# Patient Record
Sex: FEMALE | Race: WHITE | Employment: PART TIME | URBAN - METROPOLITAN AREA
[De-identification: names, ages, dates, MRNs, and addresses within clinical notes are randomized per-mention and may not be internally consistent; named-entity substitution may affect disease eponyms.]

---

## 2017-05-22 ENCOUNTER — TELEPHONE (OUTPATIENT)
Dept: OBGYN CLINIC | Age: 22
End: 2017-05-22

## 2017-05-22 RX ORDER — VALACYCLOVIR HYDROCHLORIDE 1 G/1
1000 TABLET, FILM COATED ORAL 2 TIMES DAILY
Qty: 20 TABLET | Refills: 0 | Status: SHIPPED | OUTPATIENT
Start: 2017-05-22 | End: 2017-06-01

## 2017-05-25 ENCOUNTER — HOSPITAL ENCOUNTER (OUTPATIENT)
Age: 22
Setting detail: SPECIMEN
Discharge: HOME | End: 2017-05-25
Payer: MEDICARE

## 2017-05-25 ENCOUNTER — OFFICE VISIT (OUTPATIENT)
Dept: OBGYN CLINIC | Age: 22
End: 2017-05-25
Payer: MEDICARE

## 2017-05-25 VITALS
RESPIRATION RATE: 16 BRPM | SYSTOLIC BLOOD PRESSURE: 112 MMHG | HEART RATE: 86 BPM | DIASTOLIC BLOOD PRESSURE: 60 MMHG | HEIGHT: 61 IN | BODY MASS INDEX: 37 KG/M2 | WEIGHT: 196 LBS

## 2017-05-25 DIAGNOSIS — N76.0 ACUTE VAGINITIS: ICD-10-CM

## 2017-05-25 DIAGNOSIS — N76.0 ACUTE VAGINITIS: Primary | ICD-10-CM

## 2017-05-25 DIAGNOSIS — R30.0 DYSURIA: ICD-10-CM

## 2017-05-25 LAB
-: ABNORMAL
AMORPHOUS: ABNORMAL
BACTERIA: ABNORMAL
BILIRUBIN URINE: NEGATIVE
CASTS UA: ABNORMAL /LPF
COLOR: YELLOW
COMMENT UA: ABNORMAL
CRYSTALS, UA: ABNORMAL /HPF
DIRECT EXAM: ABNORMAL
EPITHELIAL CELLS UA: ABNORMAL /HPF
GLUCOSE URINE: NEGATIVE
KETONES, URINE: NEGATIVE
LEUKOCYTE ESTERASE, URINE: NEGATIVE
Lab: ABNORMAL
MUCUS: ABNORMAL
NITRITE, URINE: NEGATIVE
OTHER OBSERVATIONS UA: ABNORMAL
PH UA: 5 (ref 5–8)
PROTEIN UA: NEGATIVE
RBC UA: ABNORMAL /HPF
RENAL EPITHELIAL, UA: ABNORMAL /HPF
SPECIFIC GRAVITY UA: 1.02 (ref 1–1.03)
SPECIMEN DESCRIPTION: ABNORMAL
SPECIMEN DESCRIPTION: ABNORMAL
STATUS: ABNORMAL
TRICHOMONAS: ABNORMAL
TURBIDITY: ABNORMAL
URINE HGB: NEGATIVE
UROBILINOGEN, URINE: NORMAL
WBC UA: ABNORMAL /HPF
YEAST: ABNORMAL

## 2017-05-25 PROCEDURE — 87510 GARDNER VAG DNA DIR PROBE: CPT

## 2017-05-25 PROCEDURE — 99213 OFFICE O/P EST LOW 20 MIN: CPT | Performed by: NURSE PRACTITIONER

## 2017-05-25 PROCEDURE — 87480 CANDIDA DNA DIR PROBE: CPT

## 2017-05-25 PROCEDURE — 87491 CHLMYD TRACH DNA AMP PROBE: CPT

## 2017-05-25 PROCEDURE — 87660 TRICHOMONAS VAGIN DIR PROBE: CPT

## 2017-05-25 PROCEDURE — 81003 URINALYSIS AUTO W/O SCOPE: CPT | Performed by: NURSE PRACTITIONER

## 2017-05-25 PROCEDURE — 81001 URINALYSIS AUTO W/SCOPE: CPT

## 2017-05-25 PROCEDURE — 87591 N.GONORRHOEAE DNA AMP PROB: CPT

## 2017-05-26 ENCOUNTER — TELEPHONE (OUTPATIENT)
Dept: OBGYN CLINIC | Age: 22
End: 2017-05-26

## 2017-05-26 LAB
C TRACH DNA GENITAL QL NAA+PROBE: NEGATIVE
N. GONORRHOEAE DNA: NEGATIVE

## 2017-05-26 RX ORDER — METRONIDAZOLE 500 MG/1
500 TABLET ORAL 2 TIMES DAILY
Qty: 14 TABLET | Refills: 0 | Status: SHIPPED | OUTPATIENT
Start: 2017-05-26 | End: 2017-06-02

## 2017-05-26 RX ORDER — FLUCONAZOLE 150 MG/1
150 TABLET ORAL ONCE
Qty: 2 TABLET | Refills: 0 | Status: SHIPPED | OUTPATIENT
Start: 2017-05-26 | End: 2017-05-26

## 2017-06-12 ENCOUNTER — TELEPHONE (OUTPATIENT)
Dept: OBGYN CLINIC | Age: 22
End: 2017-06-12

## 2017-06-12 DIAGNOSIS — N91.2 AMENORRHEA: Primary | ICD-10-CM

## 2017-06-13 ENCOUNTER — HOSPITAL ENCOUNTER (OUTPATIENT)
Age: 22
Discharge: HOME | End: 2017-06-13
Payer: MEDICARE

## 2017-06-13 DIAGNOSIS — N91.2 AMENORRHEA: ICD-10-CM

## 2017-06-13 LAB — HCG QUANTITATIVE: <1 IU/L

## 2017-06-13 PROCEDURE — 36415 COLL VENOUS BLD VENIPUNCTURE: CPT

## 2017-06-13 PROCEDURE — 84702 CHORIONIC GONADOTROPIN TEST: CPT

## 2018-03-15 ENCOUNTER — OFFICE VISIT (OUTPATIENT)
Dept: OBGYN CLINIC | Age: 23
End: 2018-03-15
Payer: MEDICARE

## 2018-03-15 ENCOUNTER — HOSPITAL ENCOUNTER (OUTPATIENT)
Age: 23
Setting detail: SPECIMEN
Discharge: HOME | End: 2018-03-15
Payer: MEDICARE

## 2018-03-15 VITALS
WEIGHT: 175 LBS | SYSTOLIC BLOOD PRESSURE: 116 MMHG | BODY MASS INDEX: 34.36 KG/M2 | HEIGHT: 60 IN | RESPIRATION RATE: 18 BRPM | HEART RATE: 78 BPM | DIASTOLIC BLOOD PRESSURE: 70 MMHG

## 2018-03-15 DIAGNOSIS — Z01.419 WELL FEMALE EXAM WITH ROUTINE GYNECOLOGICAL EXAM: Primary | ICD-10-CM

## 2018-03-15 PROCEDURE — 99395 PREV VISIT EST AGE 18-39: CPT | Performed by: ADVANCED PRACTICE MIDWIFE

## 2018-03-15 PROCEDURE — G0145 SCR C/V CYTO,THINLAYER,RESCR: HCPCS

## 2018-03-15 RX ORDER — NORGESTIMATE AND ETHINYL ESTRADIOL 7DAYSX3 28
1 KIT ORAL DAILY
Qty: 28 TABLET | Refills: 12 | Status: SHIPPED | OUTPATIENT
Start: 2018-03-15 | End: 2019-07-15 | Stop reason: ALTCHOICE

## 2018-03-15 ASSESSMENT — PATIENT HEALTH QUESTIONNAIRE - PHQ9
1. LITTLE INTEREST OR PLEASURE IN DOING THINGS: 0
SUM OF ALL RESPONSES TO PHQ QUESTIONS 1-9: 0
SUM OF ALL RESPONSES TO PHQ9 QUESTIONS 1 & 2: 0
2. FEELING DOWN, DEPRESSED OR HOPELESS: 0

## 2018-03-15 NOTE — PROGRESS NOTES
History and Physical  [address], [address] [facility] [address], [address] [facility] [phone]   Fax [phone]  [facility]  3/15/2018              22 y.o. Chief Complaint   Patient presents with    Annual Exam       Patient's last menstrual period was 2018. Primary Care Physician: Linsey Ocampo MD    The patient was seen and examined. She has no chief complaint today and is here for her annual exam.  Her bowels are regular. There are no voiding complaints. She denies any bloating. She denies vaginal discharge and was counseled on STD's and the need for barrier contraception. HPI : [facility] is a 20 y.o. female     Gyn exam No complaints, States periods are irregular wants to restart BCP to regulate periods.  Denies family or personal history of blood clots.  ________________________________________________________________________  Obstetric History       T0      L2     SAB0   TAB0   Ectopic0   Molar0   Multiple1   Live Births2       # Outcome Date GA Lbr Serafin/2nd Weight Sex Delivery Anes PTL Lv   1A  14 32w1d  3 lb 11.3 oz (1.681 kg)  CS-LTranv   RENEE      Apgar1:  3                Apgar5: 9   1B   32w1d  3 lb 12 oz (1.701 kg)  CS-LTranv   RENEE      Apgar1:  5                Apgar5: 9        Past Medical History:   Diagnosis Date    Anemia complicating pregnancy in first trimester 6/10/2014    Benign essential hypertension antepartum 2014    History of chlamydia     HSV (herpes simplex virus) infection IGM I/II 2014    TSH deficiency 6/10/2014                                                                   Past Surgical History:   Procedure Laterality Date     SECTION       Family History   Problem Relation Age of Onset    Hypertension Maternal Grandmother     High Blood Pressure Maternal Grandmother     Kidney Disease Maternal Grandmother     Lung Cancer Maternal Last Mammogram: n/a  Date of Last Colonoscopy:   Date of Last Bone Density:      ________________________________________________________________________  REVIEW OF SYSTEMS:    yes   A minimum of an eleven point review of systems was completed. Review Of Systems (11 point):  Constitutional: No fever, chills or malaise; No weight change or fatigue  Head and Eyes: No vision, Headache, Dizziness or trauma in last 12 months  ENT ROS: No hearing, Tinnitis, sinus or taste problems  Hematological and Lymphatic ROS:No Lymphoma, Von Willebrand's, Hemophillia or Bleeding History  Psych ROS: No Depression, Homicidal thoughts,suicidal thoughts, or anxiety  Breast ROS: No prior breast abnormalities or lumps  Respiratory ROS: No SOB, Pneumoniae,Cough, or Pulmonary Embolism History  Cardiovascular ROS: No Chest Pain with Exertion, Palpitations, Syncope, Edema, Arrhythmia  Gastrointestinal ROS: No Indigestion, Heartburn, Nausea, vomiting, Diarrhea, Constipation,or Bowel Changes; No Bloody Stools or melena  Genito-Urinary ROS: No Dysuria, Hematuria or Nocturia. No Urinary Incontinence or Vaginal Discharge  Musculoskeletal ROS: No Arthralgia, Arthritis,Gout,Osteoporosis or Rheumatism  Neurological ROS: No CVA, Migraines, Epilepsy, Seizure Hx, or Limb Weakness  Dermatological ROS: No Rash, Itching, Hives, Mole Changes or Cancer                                                                                                                                                                                                                                  PHYSICAL Exam:     Constitutional:  Vitals:    03/15/18 1407   BP: 116/70   Site: Left Arm   Position: Sitting   Cuff Size: Medium Adult   Pulse: 78   Resp: 18   Weight: 175 lb (79.4 kg)   Height: 5' (1.524 m)       General Appearance: This  is a well Developed, well Nourished, well groomed female. Her BMI was reviewed. Nutritional decision making was discussed.     Skin:  There was cessation and avoidance      Counseling Completed:  Preventative Health Recommendations and Follow up. The patient was seen and counseled on all forms of birth control both male and female  reversible and non. She is aware that hormonal based birth control may increase her risk of developing a blood clot which may increase her morbidity and or mortality. She was counseled on alternate non hormonal based contraception options. We discussed that smoking and any hormonal based contraception may increase the patients risks of developing these life threatening blood clots. All patients are encouraged to stop smoking at the time of contraceptive counseling. Cessation programs were reviewed. The patient was instructed to use barrier contraception for sexually transmitted disease prevention. The patient was also informed of antibiotics decreasing contraceptive efficacy and the need for barrier contraception from the onset of her antibiotic dosing and through a minimum of thirty days from antibiotic cessation. The life threatening side effect profile was reviewed in detail this includes but is not limited to shortness of breath, chest pain, severe or persistent headaches, or calf pain. If any of these occur the patient has been instructed to stop using her hormonal based contraception, notify the office, and go to the emergency department or call 911. The patient denied any personal history of blood clots in her leg, lung, or heart and denied any family history of stroke, TIA, sudden cardiac death < 36 y.o.,pulmonary embolism, or deep venous thrombosis. PLAN:  Return in about 1 year (around 3/15/2019) for Annual.   PAP collected  HRT signed  Rx sent to pharmacy  Repeat Annual every 1 year  Cervical Cytology Evaluation begins at 24years old. If Negative Cytology, Follow-up screening per current guidelines. Mammograms every 1 year. If 37 yo and last mammogram was negative.   Calcium and Vitamin D

## 2018-03-15 NOTE — PATIENT INSTRUCTIONS
Patient Education        Breast Self-Exam: Care Instructions  Your Care Instructions    A breast self-exam is when you check your breasts for lumps or changes. This regular exam helps you learn how your breasts normally look and feel. Most breast problems or changes are not because of cancer. Breast self-exam is not a substitute for a mammogram. Having regular breast exams by your doctor and regular mammograms improve your chances of finding any problems with your breasts. Some women set a time each month to do a step-by-step breast self-exam. Other women like a less formal system. They might look at their breasts as they brush their teeth, or feel their breasts once in a while in the shower. If you notice a change in your breast, tell your doctor. Follow-up care is a key part of your treatment and safety. Be sure to make and go to all appointments, and call your doctor if you are having problems. It's also a good idea to know your test results and keep a list of the medicines you take. How do you do a breast self-exam?  · The best time to examine your breasts is usually one week after your menstrual period begins. Your breasts should not be tender then. If you do not have periods, you might do your exam on a day of the month that is easy to remember. · To examine your breasts:  ¨ Remove all your clothes above the waist and lie down. When you are lying down, your breast tissue spreads evenly over your chest wall, which makes it easier to feel all your breast tissue. ¨ Use the pads-not the fingertips-of the 3 middle fingers of your left hand to check your right breast. Move your fingers slowly in small coin-sized circles that overlap. ¨ Use three levels of pressure to feel of all your breast tissue. Use light pressure to feel the tissue close to the skin surface. Use medium pressure to feel a little deeper. Use firm pressure to feel your tissue close to your breastbone and ribs.  Use each pressure level to feel your breast tissue before moving on to the next spot. ¨ Check your entire breast, moving up and down as if following a strip from the collarbone to the bra line, and from the armpit to the ribs. Repeat until you have covered the entire breast.  ¨ Repeat this procedure for your left breast, using the pads of the 3 middle fingers of your right hand. · To examine your breasts while in the shower:  ¨ Place one arm over your head and lightly soap your breast on that side. ¨ Using the pads of your fingers, gently move your hand over your breast (in the strip pattern described above), feeling carefully for any lumps or changes. ¨ Repeat for the other breast.  · Have your doctor inspect anything you notice to see if you need further testing. Where can you learn more? Go to [email or web address].[email or web address]. org and sign in to your Datalot account. Enter P148 in the Chill.com box to learn more about \"Breast Self-Exam: Care Instructions. \"     If you do not have an account, please click on the \"Sign Up Now\" link. Current as of: May 12, 2017  Content Version: 11.5  © 5411-7836 Healthwise, Incorporated. Care instructions adapted under license by [facility] ([facility]). If you have questions about a medical condition or this instruction, always ask your healthcare professional. [address] any warranty or liability for your use of this information.

## 2018-03-27 LAB — CYTOLOGY REPORT: NORMAL

## 2018-05-07 ENCOUNTER — OFFICE VISIT (OUTPATIENT)
Dept: OBGYN CLINIC | Age: 23
End: 2018-05-07
Payer: MEDICARE

## 2018-05-07 VITALS
RESPIRATION RATE: 16 BRPM | BODY MASS INDEX: 34.75 KG/M2 | DIASTOLIC BLOOD PRESSURE: 70 MMHG | HEART RATE: 74 BPM | SYSTOLIC BLOOD PRESSURE: 116 MMHG | HEIGHT: 60 IN | WEIGHT: 177 LBS

## 2018-05-07 DIAGNOSIS — N92.1 IRREGULAR INTERMENSTRUAL BLEEDING: Primary | ICD-10-CM

## 2018-05-07 PROCEDURE — 1036F TOBACCO NON-USER: CPT | Performed by: ADVANCED PRACTICE MIDWIFE

## 2018-05-07 PROCEDURE — G8417 CALC BMI ABV UP PARAM F/U: HCPCS | Performed by: ADVANCED PRACTICE MIDWIFE

## 2018-05-07 PROCEDURE — 99212 OFFICE O/P EST SF 10 MIN: CPT | Performed by: ADVANCED PRACTICE MIDWIFE

## 2018-05-07 PROCEDURE — G8427 DOCREV CUR MEDS BY ELIG CLIN: HCPCS | Performed by: ADVANCED PRACTICE MIDWIFE

## 2018-05-07 ASSESSMENT — PATIENT HEALTH QUESTIONNAIRE - PHQ9
SUM OF ALL RESPONSES TO PHQ QUESTIONS 1-9: 0
2. FEELING DOWN, DEPRESSED OR HOPELESS: 0
1. LITTLE INTEREST OR PLEASURE IN DOING THINGS: 0
SUM OF ALL RESPONSES TO PHQ9 QUESTIONS 1 & 2: 0

## 2018-11-26 ENCOUNTER — TELEPHONE (OUTPATIENT)
Dept: OBGYN CLINIC | Age: 23
End: 2018-11-26

## 2018-11-26 RX ORDER — METRONIDAZOLE 500 MG/1
500 TABLET ORAL 2 TIMES DAILY
Qty: 14 TABLET | Refills: 0 | Status: SHIPPED | OUTPATIENT
Start: 2018-11-26 | End: 2018-12-03

## 2018-11-26 NOTE — TELEPHONE ENCOUNTER
Pt requesting something for bacterial infection.   Pt instructed to call the office if symptoms persist.

## 2018-11-28 ENCOUNTER — HOSPITAL ENCOUNTER (EMERGENCY)
Age: 23
Discharge: HOME | End: 2018-11-28
Attending: EMERGENCY MEDICINE
Payer: MEDICARE

## 2018-11-28 VITALS
TEMPERATURE: 97.8 F | HEART RATE: 102 BPM | SYSTOLIC BLOOD PRESSURE: 137 MMHG | HEIGHT: 61 IN | BODY MASS INDEX: 26.43 KG/M2 | WEIGHT: 140 LBS | DIASTOLIC BLOOD PRESSURE: 77 MMHG | RESPIRATION RATE: 16 BRPM | OXYGEN SATURATION: 99 %

## 2018-11-28 DIAGNOSIS — L02.91 ABSCESS: Primary | ICD-10-CM

## 2018-11-28 PROCEDURE — 99282 EMERGENCY DEPT VISIT SF MDM: CPT

## 2018-11-28 PROCEDURE — 10060 I&D ABSCESS SIMPLE/SINGLE: CPT

## 2018-11-28 RX ORDER — CEPHALEXIN 500 MG/1
500 CAPSULE ORAL 4 TIMES DAILY
Qty: 40 CAPSULE | Refills: 0 | Status: SHIPPED | OUTPATIENT
Start: 2018-11-28 | End: 2018-12-08

## 2018-11-28 RX ORDER — SULFAMETHOXAZOLE AND TRIMETHOPRIM 800; 160 MG/1; MG/1
1 TABLET ORAL 2 TIMES DAILY
Qty: 20 TABLET | Refills: 0 | Status: SHIPPED | OUTPATIENT
Start: 2018-11-28 | End: 2018-12-08

## 2018-11-28 ASSESSMENT — PAIN SCALES - GENERAL: PAINLEVEL_OUTOF10: 10

## 2018-11-28 NOTE — ED PROVIDER NOTES
(63.5 kg)   LMP 11/15/2018 (Exact Date)   SpO2 99%   BMI 26.45 kg/m²   Constitutional:  Well developed   Eyes:  Pupils equal and readily reactive to light  HENT:  Atraumatic, external ears normal, nose normal, oropharynx moist. Neck- supple   Respiratory:  Clear to auscultation bilaterally with good air exchange, no W/R/R  Cardiovascular:  RRR with normal S1 and S2  Gastrointestinal/Abdomen:  Soft, NT.  BS present. Musculoskeletal:  No edema, no tenderness, no deformities. Back:  No CVA tenderness. Normal to inspection. Integument:  9cm x 7cm area of erythema, swelling, tenderness over right buttock. Small area of fluctuance. No streaking. No rectal involvement. Bloody/ purulent drainage noted. Neurologic:  Alert & oriented x 3, no focal deficits noted       DIAGNOSTIC RESULTS     EKG: All EKG's are interpreted by the Emergency Department Physician who either signs or Co-signs this chart in the absence of a cardiologist.  Not indicated    RADIOLOGY:   Reviewed the radiologist:  No orders to display     Not indicated      LABS:  Labs Reviewed - No data to display      EMERGENCY DEPARTMENT COURSE:   -------------------------  Incision/Drainage  Date/Time: 11/28/2018 7:13 PM  Performed by: Margaret Melendez  Authorized by: Marv Alvarado     Consent:     Consent obtained:  Verbal    Consent given by:  Patient    Risks discussed:  Bleeding, pain and incomplete drainage  Location:     Type:  Abscess    Location:  Lower extremity    Lower extremity location:  Buttock    Buttock location:  R buttock  Pre-procedure details:     Skin preparation:  Antiseptic wash  Anesthesia (see MAR for exact dosages):      Anesthesia method:  Local infiltration    Local anesthetic:  Lidocaine 1% w/o epi  Procedure type:     Complexity:  Complex  Procedure details:     Incision types:  Single straight    Scalpel blade:  11    Wound management:  Probed and deloculated    Drainage:  Bloody and purulent    Drainage amount:

## 2018-11-30 ENCOUNTER — HOSPITAL ENCOUNTER (EMERGENCY)
Age: 23
Discharge: HOME | End: 2018-11-30
Attending: EMERGENCY MEDICINE
Payer: MEDICARE

## 2018-11-30 VITALS
RESPIRATION RATE: 17 BRPM | DIASTOLIC BLOOD PRESSURE: 62 MMHG | HEART RATE: 74 BPM | BODY MASS INDEX: 26.43 KG/M2 | TEMPERATURE: 97.5 F | HEIGHT: 61 IN | OXYGEN SATURATION: 100 % | SYSTOLIC BLOOD PRESSURE: 120 MMHG | WEIGHT: 140 LBS

## 2018-11-30 DIAGNOSIS — Z51.89 WOUND CHECK, ABSCESS: Primary | ICD-10-CM

## 2018-11-30 PROCEDURE — 99282 EMERGENCY DEPT VISIT SF MDM: CPT

## 2018-11-30 ASSESSMENT — ENCOUNTER SYMPTOMS
NAUSEA: 0
SHORTNESS OF BREATH: 0
TROUBLE SWALLOWING: 0
VOMITING: 0
COUGH: 0

## 2018-11-30 NOTE — ED PROVIDER NOTES
[address]  eMERGENCYdEPARTMENT eNCOUnter      Pt Name: Emely Smith  MRN: [id number]  [place] 1995  Date of evaluation: 2018  Provider:JUNG SAWYER CNP    CHIEF COMPLAINT     No chief complaint on file. HISTORY OF PRESENT ILLNESS  (Location/Symptom, Timing/Onset, Context/Setting, Quality, Duration, Modifying Factors, Severity.)   Emely Smith is a 24 y.o. female who presents to the emergency department For abscess recheck. Patient was seen 2 days ago and had abscess I&D to right lower buttock. States that she was told to come back for packing removal.  She has been taking antibiotics as prescribed. States that she has been normal compresses and keeping wound clean. Report small amount of drainage. No fever, chills, nausea or vomiting. Nursing Notes were reviewed and I agree. REVIEW OF SYSTEMS    (2-9 systems for level 4,10 or more for level 5)     Review of Systems   Constitutional: Negative for chills and fever. HENT: Negative for trouble swallowing. Respiratory: Negative for cough and shortness of breath. Cardiovascular: Negative for chest pain and palpitations. Gastrointestinal: Negative for nausea and vomiting. Skin: Positive for wound (right buttock abscess). Except as noted above the remainder of the review of systems was reviewed andnegative. PAST MEDICAL HISTORY         Diagnosis Date    Anemia complicating pregnancy in first trimester 6/10/2014    Benign essential hypertension antepartum 2014    History of chlamydia     HSV (herpes simplex virus) infection IGM I/II 2014    TSH deficiency 6/10/2014     Reviewed. SURGICAL HISTORY           Procedure Laterality Date     SECTION       Reviewed.   CURRENT MEDICATIONS       Previous Medications    CEPHALEXIN (KEFLEX) 500 MG CAPSULE    Take 1 capsule by mouth 4 times daily for 10 days    METRONIDAZOLE (FLAGYL) 500 MG TABLET    Take 1 tablet by mouth 2 times

## 2018-12-05 ENCOUNTER — TELEPHONE (OUTPATIENT)
Dept: OBGYN CLINIC | Age: 23
End: 2018-12-05

## 2018-12-05 RX ORDER — CLOTRIMAZOLE AND BETAMETHASONE DIPROPIONATE 10; .64 MG/G; MG/G
CREAM TOPICAL
Qty: 1 TUBE | Refills: 1 | Status: SHIPPED | OUTPATIENT
Start: 2018-12-05 | End: 2019-07-15 | Stop reason: ALTCHOICE

## 2018-12-05 RX ORDER — FLUCONAZOLE 150 MG/1
150 TABLET ORAL ONCE
Qty: 2 TABLET | Refills: 0 | Status: SHIPPED | OUTPATIENT
Start: 2018-12-05 | End: 2018-12-05

## 2019-01-25 ENCOUNTER — TELEPHONE (OUTPATIENT)
Dept: OBGYN CLINIC | Age: 24
End: 2019-01-25

## 2019-01-25 RX ORDER — VALACYCLOVIR HYDROCHLORIDE 1 G/1
1000 TABLET, FILM COATED ORAL 2 TIMES DAILY
Qty: 20 TABLET | Refills: 0 | Status: CANCELLED | OUTPATIENT
Start: 2019-01-25 | End: 2019-02-04

## 2019-01-25 RX ORDER — VALACYCLOVIR HYDROCHLORIDE 500 MG/1
500 TABLET, FILM COATED ORAL 2 TIMES DAILY
Qty: 30 TABLET | Refills: 1 | Status: SHIPPED | OUTPATIENT
Start: 2019-01-25 | End: 2019-01-28

## 2019-02-06 ENCOUNTER — HOSPITAL ENCOUNTER (EMERGENCY)
Age: 24
Discharge: HOME | End: 2019-02-06
Attending: EMERGENCY MEDICINE
Payer: MEDICARE

## 2019-02-06 VITALS
BODY MASS INDEX: 26.43 KG/M2 | DIASTOLIC BLOOD PRESSURE: 73 MMHG | TEMPERATURE: 98.2 F | OXYGEN SATURATION: 100 % | HEIGHT: 61 IN | WEIGHT: 140 LBS | HEART RATE: 90 BPM | RESPIRATION RATE: 16 BRPM | SYSTOLIC BLOOD PRESSURE: 135 MMHG

## 2019-02-06 DIAGNOSIS — R21 RASH AND OTHER NONSPECIFIC SKIN ERUPTION: Primary | ICD-10-CM

## 2019-02-06 PROCEDURE — 99282 EMERGENCY DEPT VISIT SF MDM: CPT

## 2019-02-06 RX ORDER — PREDNISONE 20 MG/1
40 TABLET ORAL DAILY
Qty: 10 TABLET | Refills: 0 | Status: SHIPPED | OUTPATIENT
Start: 2019-02-06 | End: 2019-02-11

## 2019-02-06 RX ORDER — PERMETHRIN 50 MG/G
CREAM TOPICAL
Qty: 60 G | Refills: 0 | Status: SHIPPED | OUTPATIENT
Start: 2019-02-06 | End: 2019-07-15 | Stop reason: ALTCHOICE

## 2019-02-06 RX ORDER — DIPHENHYDRAMINE HCL 50 MG
50 CAPSULE ORAL EVERY 6 HOURS PRN
Qty: 20 CAPSULE | Refills: 0 | Status: SHIPPED | OUTPATIENT
Start: 2019-02-06 | End: 2019-07-15 | Stop reason: ALTCHOICE

## 2019-07-15 ENCOUNTER — OFFICE VISIT (OUTPATIENT)
Dept: OBGYN CLINIC | Age: 24
End: 2019-07-15
Payer: MEDICARE

## 2019-07-15 ENCOUNTER — HOSPITAL ENCOUNTER (OUTPATIENT)
Age: 24
Setting detail: SPECIMEN
Discharge: HOME | End: 2019-07-15
Payer: MEDICARE

## 2019-07-15 VITALS
BODY MASS INDEX: 37.57 KG/M2 | HEIGHT: 61 IN | SYSTOLIC BLOOD PRESSURE: 114 MMHG | HEART RATE: 74 BPM | WEIGHT: 199 LBS | RESPIRATION RATE: 16 BRPM | DIASTOLIC BLOOD PRESSURE: 70 MMHG

## 2019-07-15 DIAGNOSIS — Z01.419 WELL FEMALE EXAM WITH ROUTINE GYNECOLOGICAL EXAM: Primary | ICD-10-CM

## 2019-07-15 DIAGNOSIS — Z20.2 POSSIBLE EXPOSURE TO STD: ICD-10-CM

## 2019-07-15 DIAGNOSIS — Z01.419 WELL FEMALE EXAM WITH ROUTINE GYNECOLOGICAL EXAM: ICD-10-CM

## 2019-07-15 LAB
DIRECT EXAM: ABNORMAL
Lab: ABNORMAL
SPECIMEN DESCRIPTION: ABNORMAL

## 2019-07-15 PROCEDURE — 87591 N.GONORRHOEAE DNA AMP PROB: CPT

## 2019-07-15 PROCEDURE — 87491 CHLMYD TRACH DNA AMP PROBE: CPT

## 2019-07-15 PROCEDURE — 99395 PREV VISIT EST AGE 18-39: CPT | Performed by: CLINICAL NURSE SPECIALIST

## 2019-07-15 PROCEDURE — 87510 GARDNER VAG DNA DIR PROBE: CPT

## 2019-07-15 PROCEDURE — 87480 CANDIDA DNA DIR PROBE: CPT

## 2019-07-15 PROCEDURE — 87660 TRICHOMONAS VAGIN DIR PROBE: CPT

## 2019-07-15 NOTE — PROGRESS NOTES
Cancer Maternal Grandfather     Asthma Brother     Other Sister         fire    Mental Illness Father     Breast Cancer Neg Hx     Cancer Neg Hx     Colon Cancer Neg Hx     Diabetes Neg Hx     Eclampsia Neg Hx     Ovarian Cancer Neg Hx      Labor Neg Hx     Spont Abortions Neg Hx     Stroke Neg Hx      Social History     Socioeconomic History    Marital status: Single     Spouse name: Not on file    Number of children: Not on file    Years of education: Not on file    Highest education level: Not on file   Occupational History    Not on file   Social Needs    Financial resource strain: Not on file    Food insecurity:     Worry: Not on file     Inability: Not on file    Transportation needs:     Medical: Not on file     Non-medical: Not on file   Tobacco Use    Smoking status: Never Smoker    Smokeless tobacco: Never Used   Substance and Sexual Activity    Alcohol use: No     Alcohol/week: 0.0 standard drinks    Drug use: No    Sexual activity: Yes     Partners: Male     Birth control/protection: Pill   Lifestyle    Physical activity:     Days per week: Not on file     Minutes per session: Not on file    Stress: Not on file   Relationships    Social connections:     Talks on phone: Not on file     Gets together: Not on file     Attends Buddhism service: Not on file     Active member of club or organization: Not on file     Attends meetings of clubs or organizations: Not on file     Relationship status: Not on file    Intimate partner violence:     Fear of current or ex partner: Not on file     Emotionally abused: Not on file     Physically abused: Not on file     Forced sexual activity: Not on file   Other Topics Concern    Not on file   Social History Narrative    Not on file       MEDICATIONS:  No current outpatient medications on file. No current facility-administered medications for this visit.             ALLERGIES:  Allergies as of 07/15/2019    (No Known Allergies) Pulmonary Embolism History  Cardiovascular ROS: No Chest Pain with Exertion, Palpitations, Syncope, Edema, Arrhythmia  Gastrointestinal ROS: No Indigestion, Heartburn, Nausea, vomiting, Diarrhea, Constipation,or Bowel Changes; No Bloody Stools or melena  Genito-Urinary ROS: No Dysuria, Hematuria or Nocturia. No Urinary Incontinence or Vaginal Discharge  Musculoskeletal ROS: No Arthralgia, Arthritis,Gout,Osteoporosis or Rheumatism  Neurological ROS: No CVA, Migraines, Epilepsy, Seizure Hx, or Limb Weakness  Dermatological ROS: No Rash, Itching, Hives, Mole Changes or Cancer                                                                                                                                                                                                                                  PHYSICAL Exam:     Constitutional:  Vitals:    07/15/19 1204   BP: 114/70   Site: Left Upper Arm   Position: Sitting   Cuff Size: Medium Adult   Pulse: 74   Resp: 16   Weight: 199 lb (90.3 kg)   Height: 5' 1\" (1.549 m)         General Appearance: This  is a well Developed, well Nourished, well groomed female. Her BMI was reviewed. Nutritional decision making was discussed. Skin:  There was a Normal Inspection of the skin without rashes or lesions. There were no rashes. (Papular, Maculopapular, Hives, Pustular, Macular)     There were no lesions (Ulcers, Erythema, Abn. Appearing Nevi)            Lymphatic:  No Lymph Nodes were Palpable in the neck , axilla or groin.  0 # Of Lymph Nodes; Location ; Character [Normal]  [Shotty] [Tender] [Enlarged]     Neck and EENT:  The neck was supple. There were no masses   The thyroid was not enlarged and had no masses. Perrla, EOMI B/L, TMI B/L No Abnormalities. Throat inspected-No exudates or Masses, Nares Patent No Masses        Respiratory: The lungs were auscultated and found to be clear. There were no rales, rhonchi or wheezes.  There was a good respiratory 9/26/14 Baby A: M Apg 3/9 Wt: 3#11, Baby B: M Apg 5/9 Wt: 3#12 09/27/2014     Priority: High    Carpal tunnel syndrome 09/05/2014     Priority: High     Will try wrist braces for symptom relief      Benign essential hypertension, antepartum 09/05/2014     Priority: High     24hr urine protein = 280 9/5  S/p celestone 9/5 & 9/6    Updating deleted diagnoses      HSV infection IGM I/II + 07/22/2014     Priority: High     Negative IGG I & II  TX Valtrex 1000 mg/dy x 10 days then 500 mg /dy throughout pregnancy    Denies ever having lesions or prodrome    Repeat IGG type specific titres at 28 wks-Slip given      TSH deficiency 06/10/2014     Priority: High     TSH 6/10/14: 0.17  TSH 9/5/14: 1.24        Anemia 06/10/2014     Priority: High     6/10/2014 Ferrous Sulfate 325 mg # 30 one po daily with 6 RF      History of chlamydia      Priority: Medium     5/12/14 Negative  4/14/2014 Will treat today RTO in 4 weeks for test of cure 5/2014 NEG/NEG  Abstain  Partner needs treated  36 weeks repeat cultures GC and Chlamydia, HIV, RPR      BV (bacterial vaginosis) 04/14/2014     Priority: Low     Treated in ED 4/14            Hereditary Breast, Ovarian, Colon and Uterine Cancer screening Done. Tobacco & Secondary smoke risks reviewed; instructed on cessation and avoidance      Counseling Completed:  Preventative Health Recommendations and Follow up. The patient was informed of the recommended preventative health recommendations. 1. Annuals every year; Cytology collections per prevailing guidelines. 2. Mammograms begin every year at 37 yo if no abnormalities are found and no family     History. 3. Bone density studies every 2-3 years. Begin at 71 yo. If no fracture history or osteoporosis family history. (significant). 4. Colonoscopy begin at 40 yo. Repeat every ten years if negative and no family history. 5. Calcium of 6131-1936 mg/day in split dosing  6. Vitamin D 400-800 IU/day  7.  All other

## 2019-07-16 ENCOUNTER — TELEPHONE (OUTPATIENT)
Dept: OBGYN CLINIC | Age: 24
End: 2019-07-16

## 2019-07-16 ENCOUNTER — HOSPITAL ENCOUNTER (EMERGENCY)
Age: 24
Discharge: HOME | End: 2019-07-16
Attending: EMERGENCY MEDICINE
Payer: MEDICARE

## 2019-07-16 VITALS
HEIGHT: 61 IN | OXYGEN SATURATION: 100 % | BODY MASS INDEX: 36.06 KG/M2 | HEART RATE: 72 BPM | DIASTOLIC BLOOD PRESSURE: 68 MMHG | RESPIRATION RATE: 16 BRPM | TEMPERATURE: 98.4 F | SYSTOLIC BLOOD PRESSURE: 121 MMHG | WEIGHT: 191 LBS

## 2019-07-16 DIAGNOSIS — R10.12 LEFT UPPER QUADRANT PAIN: Primary | ICD-10-CM

## 2019-07-16 DIAGNOSIS — N76.0 BV (BACTERIAL VAGINOSIS): Primary | ICD-10-CM

## 2019-07-16 DIAGNOSIS — B96.89 BV (BACTERIAL VAGINOSIS): Primary | ICD-10-CM

## 2019-07-16 LAB
-: ABNORMAL
ABSOLUTE EOS #: 0.2 K/UL (ref 0–0.4)
ABSOLUTE IMMATURE GRANULOCYTE: ABNORMAL K/UL (ref 0–0.3)
ABSOLUTE LYMPH #: 2.1 K/UL (ref 1–4.8)
ABSOLUTE MONO #: 0.7 K/UL (ref 0.1–1.3)
ALBUMIN SERPL-MCNC: 4.1 G/DL (ref 3.5–5.2)
ALBUMIN/GLOBULIN RATIO: ABNORMAL (ref 1–2.5)
ALP BLD-CCNC: 104 U/L (ref 35–104)
ALT SERPL-CCNC: 9 U/L (ref 5–33)
AMORPHOUS: ABNORMAL
ANION GAP SERPL CALCULATED.3IONS-SCNC: 10 MMOL/L (ref 9–17)
AST SERPL-CCNC: 13 U/L
BACTERIA: ABNORMAL
BASOPHILS # BLD: 1 % (ref 0–2)
BASOPHILS ABSOLUTE: 0.1 K/UL (ref 0–0.2)
BILIRUB SERPL-MCNC: 0.19 MG/DL (ref 0.3–1.2)
BILIRUBIN URINE: NEGATIVE
BUN BLDV-MCNC: 12 MG/DL (ref 6–20)
BUN/CREAT BLD: ABNORMAL (ref 9–20)
C TRACH DNA GENITAL QL NAA+PROBE: NEGATIVE
CALCIUM SERPL-MCNC: 9.5 MG/DL (ref 8.6–10.4)
CASTS UA: ABNORMAL /LPF
CHLORIDE BLD-SCNC: 107 MMOL/L (ref 98–107)
CO2: 22 MMOL/L (ref 20–31)
COLOR: YELLOW
COMMENT UA: ABNORMAL
CREAT SERPL-MCNC: 0.54 MG/DL (ref 0.5–0.9)
CRYSTALS, UA: ABNORMAL /HPF
DIFFERENTIAL TYPE: ABNORMAL
EOSINOPHILS RELATIVE PERCENT: 2 % (ref 0–4)
EPITHELIAL CELLS UA: ABNORMAL /HPF
GFR AFRICAN AMERICAN: >60 ML/MIN
GFR NON-AFRICAN AMERICAN: >60 ML/MIN
GFR SERPL CREATININE-BSD FRML MDRD: ABNORMAL ML/MIN/{1.73_M2}
GFR SERPL CREATININE-BSD FRML MDRD: ABNORMAL ML/MIN/{1.73_M2}
GLUCOSE BLD-MCNC: 96 MG/DL (ref 70–99)
GLUCOSE URINE: NEGATIVE
HCG QUALITATIVE: NEGATIVE
HCT VFR BLD CALC: 33.4 % (ref 36–46)
HEMOGLOBIN: 10.6 G/DL (ref 12–16)
IMMATURE GRANULOCYTES: ABNORMAL %
KETONES, URINE: NEGATIVE
LEUKOCYTE ESTERASE, URINE: NEGATIVE
LIPASE: 44 U/L (ref 13–60)
LYMPHOCYTES # BLD: 26 % (ref 24–44)
MCH RBC QN AUTO: 25.6 PG (ref 26–34)
MCHC RBC AUTO-ENTMCNC: 31.8 G/DL (ref 31–37)
MCV RBC AUTO: 80.5 FL (ref 80–100)
MONOCYTES # BLD: 9 % (ref 1–7)
MUCUS: ABNORMAL
N. GONORRHOEAE DNA: NEGATIVE
NITRITE, URINE: NEGATIVE
NRBC AUTOMATED: ABNORMAL PER 100 WBC
OTHER OBSERVATIONS UA: ABNORMAL
PDW BLD-RTO: 16.3 % (ref 11.5–14.9)
PH UA: 7 (ref 5–8)
PLATELET # BLD: 281 K/UL (ref 150–450)
PLATELET ESTIMATE: ABNORMAL
PMV BLD AUTO: 9.1 FL (ref 6–12)
POTASSIUM SERPL-SCNC: 4.2 MMOL/L (ref 3.7–5.3)
PROTEIN UA: NEGATIVE
RBC # BLD: 4.14 M/UL (ref 4–5.2)
RBC # BLD: ABNORMAL 10*6/UL
RBC UA: ABNORMAL /HPF
RENAL EPITHELIAL, UA: ABNORMAL /HPF
SEG NEUTROPHILS: 62 % (ref 36–66)
SEGMENTED NEUTROPHILS ABSOLUTE COUNT: 5 K/UL (ref 1.3–9.1)
SODIUM BLD-SCNC: 139 MMOL/L (ref 135–144)
SPECIFIC GRAVITY UA: 1.02 (ref 1–1.03)
SPECIMEN DESCRIPTION: NORMAL
TOTAL PROTEIN: 7.4 G/DL (ref 6.4–8.3)
TRICHOMONAS: ABNORMAL
TURBIDITY: ABNORMAL
URINE HGB: NEGATIVE
UROBILINOGEN, URINE: NORMAL
WBC # BLD: 8.1 K/UL (ref 3.5–11)
WBC # BLD: ABNORMAL 10*3/UL
WBC UA: ABNORMAL /HPF
YEAST: ABNORMAL

## 2019-07-16 PROCEDURE — 87086 URINE CULTURE/COLONY COUNT: CPT

## 2019-07-16 PROCEDURE — 81001 URINALYSIS AUTO W/SCOPE: CPT

## 2019-07-16 PROCEDURE — 84703 CHORIONIC GONADOTROPIN ASSAY: CPT

## 2019-07-16 PROCEDURE — 36415 COLL VENOUS BLD VENIPUNCTURE: CPT

## 2019-07-16 PROCEDURE — 85025 COMPLETE CBC W/AUTO DIFF WBC: CPT

## 2019-07-16 PROCEDURE — 80053 COMPREHEN METABOLIC PANEL: CPT

## 2019-07-16 PROCEDURE — 99284 EMERGENCY DEPT VISIT MOD MDM: CPT

## 2019-07-16 PROCEDURE — 83690 ASSAY OF LIPASE: CPT

## 2019-07-16 RX ORDER — METRONIDAZOLE 500 MG/1
500 TABLET ORAL 2 TIMES DAILY
Qty: 14 TABLET | Refills: 0 | Status: SHIPPED | OUTPATIENT
Start: 2019-07-16 | End: 2019-07-23

## 2019-07-16 ASSESSMENT — PAIN SCALES - GENERAL: PAINLEVEL_OUTOF10: 6

## 2019-07-16 ASSESSMENT — ENCOUNTER SYMPTOMS
ABDOMINAL PAIN: 0
COUGH: 0
SHORTNESS OF BREATH: 0
NAUSEA: 0
SORE THROAT: 1

## 2019-07-16 NOTE — LETTER
[address]  [address] [facility] [address]  Phone: [phone]               July 16, 2019    Patient: Tish Price   YOB: 1995   Date of Visit: 7/16/2019       To Whom It May Concern:    Guilherme Granado was seen and treated in our emergency department on 7/16/2019. She should be excused from work on 7/16/19.       Sincerely,       Tripp Chung RN         Signature:__________________________________

## 2019-07-17 NOTE — DISCHARGE INSTR - COC
DME UFIW:514836650}  Toileting  {[facility] DME TEKN:971275061}  Feeding  {[facility] DME PREC:362399015}  Med Admin  {[facility] DME SSVN:342475455}  Med Delivery   { [facility] MED Delivery:738399884}    Wound Care Documentation and Therapy:  Incision 14 Abdomen Anterior (Active)   Number of days: 0885        Elimination:  Continence:   · Bowel: {YES / KY:53910}  · Bladder: {YES / IA:27373}  Urinary Catheter: {Urinary Catheter:120194128}   Colostomy/Ileostomy/Ileal Conduit: {YES / OV:29889}       Date of Last BM: ***  No intake or output data in the 24 hours ending 19 0037  No intake/output data recorded.     Safety Concerns:     [address] [employer] Safety Concerns:098802863}    Impairments/Disabilities:      [address] Impairments/Disabilities:963911989}    Nutrition Therapy:  Current Nutrition Therapy:   [address] [employer] Diet List:637929425}    Routes of Feeding: {[facility] DME Other Feedings:273198292}  Liquids: {Slp liquid thickness:17790}  Daily Fluid Restriction: {[facility] DME Yes amt example:636571363}  Last Modified Barium Swallow with Video (Video Swallowing Test): {Done Not Done SOND:373572868}    Treatments at the Time of Hospital Discharge:   Respiratory Treatments: ***  Oxygen Therapy:  {Therapy; copd oxygen:87380}  Ventilator:    { CC Vent OQWV:342062203}    Rehab Therapies: {THERAPEUTIC INTERVENTION:4560231604}  Weight Bearing Status/Restrictions: [employer] Weight Bearin}  Other Medical Equipment (for information only, NOT a DME order):  {EQUIPMENT:174499583}  Other Treatments: ***    Patient's personal belongings (please select all that are sent with patient):  {[facility] DME Belongings:290619460}    RN SIGNATURE:  {Esignature:830168880}    CASE MANAGEMENT/SOCIAL WORK SECTION    Inpatient Status Date: ***    Readmission Risk Assessment Score:  Readmission Risk              Risk of Unplanned Readmission:        0           Discharging to Facility/ Agency   · Name:   · Address:  · Phone:  · Fax:    Dialysis Facility (if applicable)

## 2019-07-17 NOTE — ED PROVIDER NOTES
[address]  eMERGENCY dEPARTMENT eNCOUnter   Attending Attestation     Pt Name: José Miguel Don  MRN: [id number]  [place] 1995  Date of evaluation: 7/16/19    History, EXAM, MDM:    José Miguel Don is a 21 y.o. female who presents with Abdominal Pain and Pharyngitis  24-year-old female 3 days left upper quadrant abdominal pain. Constipation. She is having bowel movements. No vomiting. Currently taking the course of Flagyl for BV. Exam vital signs are all stable abdomen soft nontender. Laboratory studies are unremarkable. Etiology of the patient's pain is unclear at this time. D/w pt treatment plan, warning precautions for prompt ED return and importance of close OP FU, she verbalizes understanding and agrees with the treatment plan. Vitals:   Vitals:    07/16/19 2117   BP: (!) 148/86   Pulse: 81   Resp: 16   Temp: 98 °F (36.7 °C)   TempSrc: Oral   SpO2: 98%   Weight: 191 lb (86.6 kg)   Height: 5' 1\" (1.549 m)     I performed a history and physical examination of the patient and discussed management with the resident. I reviewed the residents note and agree with the documented findings and plan of care. Any areas of disagreement are noted on the chart. I was personally present for the key portions of any procedures. I have documented in the chart those procedures where I was not present during the key portions. I have personally reviewed all images and agree with the resident's interpretation. I have reviewed the emergency nurses triage note. I agree with the chief complaint, past medical history, past surgical history, allergies, medications, social and family history as documented unless otherwise noted below. Documentation of the HPI, Physical Exam and Medical Decision Making performed by medical students or scribes is based on my personal performance of the HPI, PE and MDM.  For Phys Assistant/ Nurse Practitioner cases/documentation I have had a face to face evaluation of this

## 2019-07-18 LAB
CULTURE: NORMAL
Lab: NORMAL
SPECIMEN DESCRIPTION: NORMAL

## 2019-09-10 ENCOUNTER — HOSPITAL ENCOUNTER (EMERGENCY)
Age: 24
Discharge: HOME | End: 2019-09-10
Attending: EMERGENCY MEDICINE
Payer: MEDICARE

## 2019-09-10 VITALS
TEMPERATURE: 98 F | HEART RATE: 85 BPM | RESPIRATION RATE: 14 BRPM | BODY MASS INDEX: 26.43 KG/M2 | WEIGHT: 140 LBS | DIASTOLIC BLOOD PRESSURE: 80 MMHG | SYSTOLIC BLOOD PRESSURE: 137 MMHG | OXYGEN SATURATION: 99 % | HEIGHT: 61 IN

## 2019-09-10 DIAGNOSIS — J02.9 ACUTE PHARYNGITIS, UNSPECIFIED ETIOLOGY: ICD-10-CM

## 2019-09-10 DIAGNOSIS — H69.81 DYSFUNCTION OF RIGHT EUSTACHIAN TUBE: Primary | ICD-10-CM

## 2019-09-10 LAB
DIRECT EXAM: NORMAL
Lab: NORMAL
SPECIMEN DESCRIPTION: NORMAL

## 2019-09-10 PROCEDURE — 99283 EMERGENCY DEPT VISIT LOW MDM: CPT

## 2019-09-10 PROCEDURE — 87880 STREP A ASSAY W/OPTIC: CPT

## 2019-09-10 ASSESSMENT — PAIN DESCRIPTION - PAIN TYPE: TYPE: ACUTE PAIN

## 2019-09-10 ASSESSMENT — PAIN SCALES - GENERAL: PAINLEVEL_OUTOF10: 6

## 2019-09-10 ASSESSMENT — PAIN DESCRIPTION - DESCRIPTORS: DESCRIPTORS: SORE

## 2019-09-10 ASSESSMENT — PAIN DESCRIPTION - LOCATION: LOCATION: THROAT

## 2019-09-10 ASSESSMENT — ENCOUNTER SYMPTOMS: SORE THROAT: 1

## 2019-09-10 NOTE — ED PROVIDER NOTES
[address]  eMERGENCY dEPARTMENT eNCOUnter      Pt Name: Cristi Spencer  MRN: [id number]  [place] 1995  Date of evaluation: 9/10/19      CHIEF COMPLAINT       Chief Complaint   Patient presents with    Otalgia    Pharyngitis         HISTORY OF PRESENT ILLNESS    Cristi Spencer is a 21 y.o. female who presents complaining of right ear pain and sore throat  The history is provided by the patient. Ear Problem   Location:  Right  Behind ear:  No abnormality  Quality:  Aching  Severity:  Mild  Onset quality:  Sudden  Duration:  3 days  Timing:  Constant  Progression:  Worsening  Chronicity:  New  Relieved by:  Nothing  Worsened by:  Nothing  Ineffective treatments:  None tried  Associated symptoms: sore throat    Associated symptoms: no ear discharge        REVIEW OF SYSTEMS       Review of Systems   HENT: Positive for ear pain and sore throat. Negative for ear discharge. All other systems reviewed and are negative. PAST MEDICAL HISTORY     Past Medical History:   Diagnosis Date    Anemia complicating pregnancy in first trimester 6/10/2014    Benign essential hypertension antepartum 2014    History of chlamydia     HSV (herpes simplex virus) infection IGM I/II 2014    TSH deficiency 6/10/2014       SURGICAL HISTORY       Past Surgical History:   Procedure Laterality Date     SECTION         CURRENT MEDICATIONS       Previous Medications    No medications on file       ALLERGIES     has No Known Allergies. FAMILY HISTORY     She indicated that her mother is alive. She indicated that her father is alive. She indicated that her sister is . She indicated that her brother is alive. She indicated that her maternal grandmother is alive. She indicated that her maternal grandfather is . She indicated that her paternal grandmother is . She indicated that her paternal grandfather is . She indicated that the status of her neg hx is unknown. SOCIAL HISTORY      reports that she has never smoked. She has never used smokeless tobacco. She reports that she does not drink alcohol or use drugs. PHYSICAL EXAM     INITIAL VITALS: /80   Pulse 85   Temp 98 °F (36.7 °C)   Resp 14   Ht 5' 1\" (1.549 m)   Wt 140 lb (63.5 kg)   SpO2 99%   BMI 26.45 kg/m²      Physical Exam   Constitutional: She is oriented to person, place, and time. She appears well-developed and well-nourished. HENT:   Head: Normocephalic and atraumatic. Right Ear: Hearing, tympanic membrane and external ear normal.   Left Ear: Hearing, tympanic membrane and external ear normal.   Mouth/Throat: Posterior oropharyngeal erythema present. No oropharyngeal exudate or tonsillar abscesses. Cardiovascular: Normal rate, regular rhythm and normal heart sounds. Pulmonary/Chest: Effort normal and breath sounds normal.   Neurological: She is alert and oriented to person, place, and time. Nursing note and vitals reviewed. MEDICAL DECISION MAKING:   Strep negative physical exam unremarkable. Likely eustachian tube dysfunction. Instructed to increase fluids warm salt water gargles Tylenol Motrin for pain May use over-the-counter decongestant. Instructed to [place] frequently. Follow-up with primary care physician in 1 to 2 days for recheck return for any worsening symptoms any other concerns    DIAGNOSTIC RESULTS     EKG: All EKG's are interpreted by the Emergency Department Physician who either signs or Co-signs this chart in the absence of acardiologist.        RADIOLOGY:[place] film, CT, MRI, and formal ultrasound images (except ED bedside ultrasound) are read by the radiologist and the images and interpretations are directly viewed by the emergency physician. LABS:All lab results were reviewed by myself, and all abnormals are listed below.   Labs Reviewed   STREP SCREEN GROUP A THROAT         EMERGENCY DEPARTMENT COURSE:   Vitals:    Vitals:    09/10/19 0918   BP:

## 2019-09-16 RX ORDER — NORGESTIMATE AND ETHINYL ESTRADIOL 7DAYSX3 28
1 KIT ORAL DAILY
Qty: 28 TABLET | Refills: 3 | Status: CANCELLED | OUTPATIENT
Start: 2019-09-16

## 2019-09-16 RX ORDER — NORGESTIMATE AND ETHINYL ESTRADIOL 7DAYSX3 28
1 KIT ORAL DAILY
Qty: 28 TABLET | Refills: 3 | Status: SHIPPED | OUTPATIENT
Start: 2019-09-16 | End: 2019-10-21 | Stop reason: SDUPTHER

## 2019-09-16 NOTE — TELEPHONE ENCOUNTER
Pt requesting refill on birthcontrol. Pt instructed to start birthcontrol after next period and schedule 3 month follow up in office.

## 2019-10-22 RX ORDER — NORGESTIMATE AND ETHINYL ESTRADIOL 7DAYSX3 28
1 KIT ORAL DAILY
Qty: 28 TABLET | Refills: 3 | Status: SHIPPED | OUTPATIENT
Start: 2019-10-22 | End: 2019-12-19

## 2019-12-10 ENCOUNTER — HOSPITAL ENCOUNTER (OUTPATIENT)
Age: 24
Setting detail: SPECIMEN
Discharge: HOME | End: 2019-12-10
Payer: MEDICARE

## 2019-12-10 DIAGNOSIS — N92.6 MISSED PERIOD: Primary | ICD-10-CM

## 2019-12-10 DIAGNOSIS — N92.6 MISSED PERIOD: ICD-10-CM

## 2019-12-10 LAB — HCG QUANTITATIVE: <1 IU/L

## 2019-12-10 PROCEDURE — 36415 COLL VENOUS BLD VENIPUNCTURE: CPT

## 2019-12-10 PROCEDURE — 84702 CHORIONIC GONADOTROPIN TEST: CPT

## 2019-12-19 ENCOUNTER — OFFICE VISIT (OUTPATIENT)
Dept: OBGYN CLINIC | Age: 24
End: 2019-12-19
Payer: MEDICARE

## 2019-12-19 VITALS
BODY MASS INDEX: 38.71 KG/M2 | RESPIRATION RATE: 18 BRPM | DIASTOLIC BLOOD PRESSURE: 74 MMHG | HEART RATE: 78 BPM | HEIGHT: 61 IN | WEIGHT: 205 LBS | SYSTOLIC BLOOD PRESSURE: 118 MMHG

## 2019-12-19 DIAGNOSIS — N91.2 AMENORRHEA: Primary | ICD-10-CM

## 2019-12-19 PROCEDURE — G8484 FLU IMMUNIZE NO ADMIN: HCPCS | Performed by: ADVANCED PRACTICE MIDWIFE

## 2019-12-19 PROCEDURE — 1036F TOBACCO NON-USER: CPT | Performed by: ADVANCED PRACTICE MIDWIFE

## 2019-12-19 PROCEDURE — G8427 DOCREV CUR MEDS BY ELIG CLIN: HCPCS | Performed by: ADVANCED PRACTICE MIDWIFE

## 2019-12-19 PROCEDURE — G8417 CALC BMI ABV UP PARAM F/U: HCPCS | Performed by: ADVANCED PRACTICE MIDWIFE

## 2019-12-19 PROCEDURE — 99213 OFFICE O/P EST LOW 20 MIN: CPT | Performed by: ADVANCED PRACTICE MIDWIFE

## 2020-02-10 ENCOUNTER — TELEPHONE (OUTPATIENT)
Dept: OBGYN CLINIC | Age: 25
End: 2020-02-10

## 2020-02-10 RX ORDER — METRONIDAZOLE 500 MG/1
500 TABLET ORAL 2 TIMES DAILY
Qty: 14 TABLET | Refills: 0 | Status: SHIPPED | OUTPATIENT
Start: 2020-02-10 | End: 2020-02-17

## 2020-08-14 ENCOUNTER — OFFICE VISIT (OUTPATIENT)
Dept: OBGYN CLINIC | Age: 25
End: 2020-08-14
Payer: MEDICARE

## 2020-08-14 VITALS
TEMPERATURE: 98.2 F | SYSTOLIC BLOOD PRESSURE: 118 MMHG | DIASTOLIC BLOOD PRESSURE: 70 MMHG | HEIGHT: 61 IN | BODY MASS INDEX: 39.84 KG/M2 | WEIGHT: 211 LBS | HEART RATE: 86 BPM

## 2020-08-14 PROCEDURE — 99385 PREV VISIT NEW AGE 18-39: CPT | Performed by: NURSE PRACTITIONER

## 2020-08-14 RX ORDER — .ALPHA.-TOCOPHEROL ACETATE, DL-, ASCORBIC ACID, CHOLECALCIFEROL, CYANOCOBALAMIN, FOLIC ACID, FERROUS FUMARATE, CALCIUM PHOSPHATE, DIBASIC, ANHYDROUS, NIACINAMIDE, PYRIDOXINE HYDROCHLORIDE, RIBOFLAVIN, THIAMINE MONONITRATE, AND VITAMIN A ACETATE 15; 60; 400; 4.5; 1; 27; 50; 13.5; 1.05; 1.2; 1.05; 25 [IU]/1; MG/1; [IU]/1; UG/1; MG/1; MG/1; MG/1; MG/1; MG/1; MG/1; MG/1; [IU]/1
1 TABLET ORAL DAILY
Qty: 30 TABLET | Refills: 11 | Status: SHIPPED | OUTPATIENT
Start: 2020-08-14 | End: 2020-10-06

## 2020-08-14 RX ORDER — PNV NO.95/FERROUS FUM/FOLIC AC 28MG-0.8MG
1 TABLET ORAL DAILY
Qty: 30 TABLET | Refills: 12 | Status: SHIPPED | OUTPATIENT
Start: 2020-08-14 | End: 2021-01-19

## 2020-08-14 ASSESSMENT — PATIENT HEALTH QUESTIONNAIRE - PHQ9
SUM OF ALL RESPONSES TO PHQ9 QUESTIONS 1 & 2: 0
SUM OF ALL RESPONSES TO PHQ QUESTIONS 1-9: 0
2. FEELING DOWN, DEPRESSED OR HOPELESS: 0
1. LITTLE INTEREST OR PLEASURE IN DOING THINGS: 0
SUM OF ALL RESPONSES TO PHQ QUESTIONS 1-9: 0

## 2020-08-14 NOTE — PROGRESS NOTES
History and Physical  [address], [address] [facility] [address] [phone]   Fax [phone]  [address] [facility]  2020              25 y.o. Chief Complaint   Patient presents with    Annual Exam       Patient's last menstrual period was 2020. Primary Care Physician: Aline Youngblood MD    The patient was seen and examined. She has no chief complaint today and is here for her annual exam. States she has been trying to get pregnant for the past 3 years. Has twins with current partner. Her bowels are regular. There are no voiding complaints. She denies any bloating. She denies vaginal discharge and was counseled on STD's and the need for barrier contraception.      HPI : Dilma [facility] Alize is a 31 y.o. female     Annual exam   No chief complaint  Desires pregnancy  ________________________________________________________________________  OB History    Para Term  AB Living   1 1 0 1 0 2   SAB TAB Ectopic Molar Multiple Live Births   0 0 0 0 1 2      # Outcome Date GA Lbr Serafin/2nd Weight Sex Delivery Anes PTL Lv   1A  14 32w1d  3 lb 11.3 oz (1.681 kg)  CS-LTranv   RENEE      Apgar1: 3  Apgar5: 9   1B   32w1d  3 lb 12 oz (1.701 kg)  CS-LTranv   RENEE      Apgar1: 5  Apgar5: 9     Past Medical History:   Diagnosis Date    Anemia complicating pregnancy in first trimester 6/10/2014    Benign essential hypertension antepartum 2014    History of chlamydia     HSV (herpes simplex virus) infection IGM I/II 2014    TSH deficiency 6/10/2014                                                                   Past Surgical History:   Procedure Laterality Date     SECTION       Family History   Problem Relation Age of Onset    Hypertension Maternal Grandmother     High Blood Pressure Maternal Grandmother     Kidney Disease Maternal Grandmother     Lung Cancer Maternal Grandfather     Asthma Brother     Other Sister         fire    Mental Illness Father     Breast Cancer Neg Hx     Cancer Neg Hx     Colon Cancer Neg Hx     Diabetes Neg Hx     Eclampsia Neg Hx     Ovarian Cancer Neg Hx      Labor Neg Hx     Spont Abortions Neg Hx     Stroke Neg Hx      Social History     Socioeconomic History    Marital status: Single     Spouse name: Not on file    Number of children: Not on file    Years of education: Not on file    Highest education level: Not on file   Occupational History    Not on file   Social Needs    Financial resource strain: Not on file    Food insecurity     Worry: Not on file     Inability: Not on file    Transportation needs     Medical: Not on file     Non-medical: Not on file   Tobacco Use    Smoking status: Never Smoker    Smokeless tobacco: Never Used   Substance and Sexual Activity    Alcohol use: No     Alcohol/week: 0.0 standard drinks    Drug use: No    Sexual activity: Yes     Partners: Male     Birth control/protection: Pill   Lifestyle    Physical activity     Days per week: Not on file     Minutes per session: Not on file    Stress: Not on file   Relationships    Social connections     Talks on phone: Not on file     Gets together: Not on file     Attends [employer] service: Not on file     Active member of club or organization: Not on file     Attends meetings of clubs or organizations: Not on file     Relationship status: Not on file    Intimate partner violence     Fear of current or ex partner: Not on file     Emotionally abused: Not on file     Physically abused: Not on file     Forced sexual activity: Not on file   Other Topics Concern    Not on file   Social History Narrative    Not on file       MEDICATIONS:  Current Outpatient Medications   Medication Sig Dispense Refill    Prenatal Vit-Fe Fumarate-FA (PRENATAL VITAMINS) 28-0.8 MG TABS Take 1 tablet by mouth daily 30 tablet 12    Prenatal Vit-Fe Fumarate-FA (PNV FOLIC ACID + IRON) 27-1 MG TABS Take 1 tablet by mouth daily 30 tablet 11    Prenatal Multivit-Min-Fe-FA (PRENATAL VITAMINS) 0.8 MG TABS Take 1 tablet by mouth daily (Patient not taking: Reported on 8/14/2020) 30 tablet 12     No current facility-administered medications for this visit. ALLERGIES:  Allergies as of 08/14/2020    (No Known Allergies)       Symptoms of decreased mood absent  Symptoms of anhedonia absent    **If either question is answered in a  positive fashion then complete the PHQ9 Scoring Evaluation and make the appropriate referral**      Immunization status: stated as current, but no records available. Gynecologic History:  Menarche: 6 yo  Menopause at [age] yo     Patient's last menstrual period was 08/04/2020. Sexually Active: Yes    STD History: No     Permanent Sterilization: No   Reversible Birth Control: No        Hormone Replacement Exposure: No      Genetic Qualified Family History of Breast, Ovarian , Colon or Uterine Cancer: No     If YES see scanned worksheet. Preventative Health Testing:    Health Maintenance:  Health Maintenance Due   Topic Date Due    TSH testing  09/05/2015    Cervical cancer screen  03/15/2019    Potassium monitoring  07/16/2020    Creatinine monitoring  07/16/2020       Date of Last Pap Smear: 03/15/2018 neg  Abnormal Pap Smear History: denies  Colposcopy History:   Date of Last Mammogram: NA  Date of Last Colonoscopy:   Date of Last Bone Density:      ________________________________________________________________________        REVIEW OF SYSTEMS:    yes   A minimum of an eleven point review of systems was completed. Review Of Systems (11 point):  Constitutional: No fever, chills or malaise;  No weight change or fatigue  Head and Eyes: No vision, Headache, Dizziness or trauma in last 12 months  ENT ROS: No hearing, Tinnitis, sinus or taste problems  Hematological and Lymphatic ROS:No Lymphoma, Von Willebrand's, Hemophillia or Bleeding History  Psych ROS: No Depression, Homicidal thoughts,suicidal thoughts, or anxiety  Breast ROS: No prior breast abnormalities or lumps  Respiratory ROS: No SOB, Pneumoniae,Cough, or Pulmonary Embolism History  Cardiovascular ROS: No Chest Pain with Exertion, Palpitations, Syncope, Edema, Arrhythmia  Gastrointestinal ROS: No Indigestion, Heartburn, Nausea, vomiting, Diarrhea, Constipation,or Bowel Changes; No Bloody Stools or melena  Genito-Urinary ROS: No Dysuria, Hematuria or Nocturia. No Urinary Incontinence or Vaginal Discharge  Musculoskeletal ROS: No Arthralgia, Arthritis,Gout,Osteoporosis or Rheumatism  Neurological ROS: No CVA, Migraines, Epilepsy, Seizure Hx, or Limb Weakness  Dermatological ROS: No Rash, Itching, Hives, Mole Changes or Cancer                                                                                                                                                                                                                                  PHYSICAL Exam:     Constitutional:  Vitals:    08/14/20 1220   BP: 118/70   Site: Right Upper Arm   Position: Sitting   Cuff Size: Medium Adult   Pulse: 86   Temp: 98.2 °F (36.8 °C)   Weight: 211 lb (95.7 kg)   Height: 5' 1\" (1.549 m)         General Appearance: This  is a well Developed, well Nourished, well groomed female. Her BMI was reviewed. Nutritional decision making was discussed. Skin:  There was a Normal Inspection of the skin without rashes or lesions. There were no rashes. (Papular, Maculopapular, Hives, Pustular, Macular)     There were no lesions (Ulcers, Erythema, Abn. Appearing Nevi)            Lymphatic:  No Lymph Nodes were Palpable in the neck , axilla or groin.  0 # Of Lymph Nodes; Location ; Character [Normal]  [Shotty] [Tender] [Enlarged]     Neck and EENT:  The neck was supple. There were no masses   The thyroid was not enlarged and had no masses. Perrla, EOMI B/L, TMI B/L No Abnormalities.    Throat inspected-No exudates or Masses, Nares Patent No Masses        Respiratory: The lungs were auscultated and found to be clear. There were no rales, rhonchi or wheezes. There was a good respiratory effort. Cardiovascular: The heart was in a regular rate and rhythm. . No S3 or S4. There was no murmur appreciated. Location, grade, and radiation are not applicable. Extremities: The patients extremities were without calf tenderness, edema, or varicosities. There was full range of motion in all four extremities. Pulses in all four extremities were appreciated and are 2/4. Abdomen: The abdomen was soft and non-tender. There were good bowel sounds in all quadrants and there was no guarding, rebound or rigidity. On evaluation there was no evidence of hepatosplenomegaly and there was no costal vertebral davida tenderness bilaterally. No hernias were appreciated. Abdominal Scars: c/s    Psych: The patient had a normal Orientation to: Time, Place, Person, and Situation  There is no Mood / Affect changes    Breast:  (Chest)  normal appearance, no masses or tenderness  Self breast exams were reviewed in detail. Literature was given. Pelvic Exam:  Vulva and vagina appear normal. Bimanual exam reveals normal uterus and adnexa. Rectal Exam:  exam declined by patient          Musculosk:  Normal Gait and station was noted. Digits were evaluated without abnormal findings. Range of motion, stability and strength were evaluated and found to be appropriate for the patients age. ASSESSMENT:      31 y.o. Annual   Diagnosis Orders   1.  Well woman exam (no gynecological exam)            Chief Complaint   Patient presents with    Annual Exam          Past Medical History:   Diagnosis Date    Anemia complicating pregnancy in first trimester 6/10/2014    Benign essential hypertension antepartum 9/5/2014    History of chlamydia     HSV (herpes simplex virus) infection IGM I/II 7/22/2014    TSH deficiency 6/10/2014 Patient Active Problem List    Diagnosis Date Noted    PLTCS 9/26/14 Baby A: M Apg 3/9 Wt: 3#11, Baby B: M Apg 5/9 Wt: 3#12 09/27/2014     Priority: High    Carpal tunnel syndrome 09/05/2014     Priority: High     Will try wrist braces for symptom relief      Benign essential hypertension, antepartum 09/05/2014     Priority: High     24hr urine protein = 280 9/5  S/p celestone 9/5 & 9/6    Updating deleted diagnoses      HSV infection IGM I/II + 07/22/2014     Priority: High     Negative IGG I & II  TX Valtrex 1000 mg/dy x 10 days then 500 mg /dy throughout pregnancy    Denies ever having lesions or prodrome    Repeat IGG type specific titres at 28 wks-Slip given      TSH deficiency 06/10/2014     Priority: High     TSH 6/10/14: 0.17  TSH 9/5/14: 1.24        Anemia 06/10/2014     Priority: High     6/10/2014 Ferrous Sulfate 325 mg # 30 one po daily with 6 RF      History of chlamydia      Priority: Medium     5/12/14 Negative  4/14/2014 Will treat today RTO in 4 weeks for test of cure 5/2014 NEG/NEG  Abstain  Partner needs treated  36 weeks repeat cultures GC and Chlamydia, HIV, RPR      BV (bacterial vaginosis) 04/14/2014     Priority: Low     Treated in ED 4/14            Hereditary Breast, Ovarian, Colon and Uterine Cancer screening Done. Tobacco & Secondary smoke risks reviewed; instructed on cessation and avoidance      Counseling Completed:  Preventative Health Recommendations and Follow up. The patient was informed of the recommended preventative health recommendations. 1. Annuals every year; Cytology collections per prevailing guidelines. 2. Mammograms begin every year at 35 yo if no abnormalities are found and no family history. 3. Bone density studies every 2-3 years. Begin at 71 yo. If no fracture history or osteoporosis family history. (significant). 4. Colonoscopy begin at 40 yo. Repeat every ten years if negative and no family history.   5. Calcium of 2500-5129 mg/day

## 2020-09-21 ENCOUNTER — HOSPITAL ENCOUNTER (EMERGENCY)
Age: 25
Discharge: HOME | End: 2020-09-21
Attending: EMERGENCY MEDICINE
Payer: MEDICARE

## 2020-09-21 ENCOUNTER — APPOINTMENT (OUTPATIENT)
Dept: GENERAL RADIOLOGY | Age: 25
End: 2020-09-21
Payer: MEDICARE

## 2020-09-21 VITALS
DIASTOLIC BLOOD PRESSURE: 74 MMHG | OXYGEN SATURATION: 100 % | BODY MASS INDEX: 40.4 KG/M2 | TEMPERATURE: 97.8 F | SYSTOLIC BLOOD PRESSURE: 139 MMHG | WEIGHT: 214 LBS | HEART RATE: 76 BPM | HEIGHT: 61 IN | RESPIRATION RATE: 16 BRPM

## 2020-09-21 LAB
DIRECT EXAM: NORMAL
Lab: NORMAL
SPECIMEN DESCRIPTION: NORMAL

## 2020-09-21 PROCEDURE — 87880 STREP A ASSAY W/OPTIC: CPT

## 2020-09-21 PROCEDURE — 71045 X-RAY EXAM CHEST 1 VIEW: CPT

## 2020-09-21 PROCEDURE — 99285 EMERGENCY DEPT VISIT HI MDM: CPT

## 2020-09-21 PROCEDURE — 6370000000 HC RX 637 (ALT 250 FOR IP): Performed by: PHYSICIAN ASSISTANT

## 2020-09-21 RX ORDER — PREDNISONE 20 MG/1
40 TABLET ORAL DAILY
Qty: 10 TABLET | Refills: 0 | Status: SHIPPED | OUTPATIENT
Start: 2020-09-21 | End: 2020-09-26

## 2020-09-21 RX ORDER — IBUPROFEN 800 MG/1
800 TABLET ORAL ONCE
Status: COMPLETED | OUTPATIENT
Start: 2020-09-21 | End: 2020-09-21

## 2020-09-21 RX ORDER — GUAIFENESIN 600 MG/1
600 TABLET, EXTENDED RELEASE ORAL 2 TIMES DAILY
Qty: 14 TABLET | Refills: 0 | Status: SHIPPED | OUTPATIENT
Start: 2020-09-21 | End: 2020-09-28

## 2020-09-21 RX ADMIN — IBUPROFEN 800 MG: 800 TABLET ORAL at 15:18

## 2020-09-21 ASSESSMENT — PAIN DESCRIPTION - DESCRIPTORS: DESCRIPTORS: PRESSURE

## 2020-09-21 ASSESSMENT — PAIN DESCRIPTION - LOCATION: LOCATION: HEAD

## 2020-09-21 ASSESSMENT — PAIN DESCRIPTION - PAIN TYPE: TYPE: ACUTE PAIN

## 2020-09-21 ASSESSMENT — PAIN SCALES - GENERAL
PAINLEVEL_OUTOF10: 4
PAINLEVEL_OUTOF10: 4

## 2020-09-21 ASSESSMENT — PAIN DESCRIPTION - ONSET: ONSET: ON-GOING

## 2020-09-21 NOTE — ED PROVIDER NOTES
[address]  eMERGENCY dEPARTMENT eNCOUnter      Pt Name: George Mclaughlin  MRN: [id number]  [place] 1995  Date of evaluation: 2020  Provider: Karlene Mckeon PA-C    CHIEF COMPLAINT       Chief Complaint   Patient presents with    Cough     patient reports symptom onset was 2 weeks ago. Patient reports that she was seen at [facility], but was not given antibiotics. Patient reports that \"everything is getting worse. \"    Shortness of Breath    Pharyngitis    Otalgia     bilateral           HISTORY OF PRESENT ILLNESS  (Location/Symptom, Timing/Onset, Context/Setting, Quality, Duration, Modifying Factors, Severity.)   George Mclaughlin is a 31 y.o. female who presents to the emergency department with complaints of sore throat, ear pain, headache, nasal congestion, cough, sob. Symptoms began 2-2.5 weeks ago. Pt states she was evaluated at outside ED 1 week ago. Pt was diagnosed with viral illness and was started on naproxen. Currently, pt states symptoms have been worsening. Cough is productive with nonbloody phlegm. She denies fever, chills, wheezing, chest pain, nausea, emesis, abd pain. Pt has no medical problems. No other complaints. Nursing Notes were reviewed. REVIEW OF SYSTEMS    (2-9 systems for level 4, 10 or more for level 5)     Review of Systems   Cough  Sob  Ear pain  Sore throat  Congestion  Headache      Except as noted above the remainder of the review of systems was reviewed and negative.        PAST MEDICAL HISTORY     Past Medical History:   Diagnosis Date    Anemia complicating pregnancy in first trimester 6/10/2014    Benign essential hypertension antepartum 2014    History of chlamydia     HSV (herpes simplex virus) infection IGM I/II 2014    TSH deficiency 6/10/2014     None otherwise stated in nurses notes    SURGICAL HISTORY       Past Surgical History:   Procedure Laterality Date     SECTION       None otherwise stated in nurses notes    CURRENT MEDICATIONS       Previous Medications    PRENATAL VIT-FE FUMARATE-FA (PNV FOLIC ACID + IRON) 52-2 MG TABS    Take 1 tablet by mouth daily    PRENATAL VIT-FE FUMARATE-FA (PRENATAL VITAMINS) 28-0.8 MG TABS    Take 1 tablet by mouth daily       ALLERGIES     Patient has no known allergies. FAMILY HISTORY           Problem Relation Age of Onset    Hypertension Maternal Grandmother     High Blood Pressure Maternal Grandmother     Kidney Disease Maternal Grandmother     Lung Cancer Maternal Grandfather     Asthma Brother     Other Sister         fire    Mental Illness Father     Breast Cancer Neg Hx     Cancer Neg Hx     Colon Cancer Neg Hx     Diabetes Neg Hx     Eclampsia Neg Hx     Ovarian Cancer Neg Hx      Labor Neg Hx     Spont Abortions Neg Hx     Stroke Neg Hx      Family Status   Relation Name Status    MGM  Alive    MGF      Brother  Alive    Sister      PGF      PGM      Father  Alive    Mother  Alive    Neg Hx  (Not Specified)      None otherwise stated in nurses notes    SOCIAL HISTORY      reports that she has never smoked. She has never used smokeless tobacco. She reports that she does not drink alcohol or use drugs. lives at home with others     PHYSICAL EXAM    (up to 7 for level 4, 8 or more for level 5)     ED Triage Vitals [20 1419]   BP Temp Temp Source Pulse Resp SpO2 Height Weight   139/74 97.8 °F (36.6 °C) Oral 76 16 100 % 5' 1\" (1.549 m) 214 lb (97.1 kg)       Physical Exam   Nursing note and vitals reviewed. Constitutional: Oriented to person, place, and time and well-developed, well-nourished. Head: Normocephalic and atraumatic. Ear: External ears normal. TM non-erythematous bilaterally with no canal erythema or swelling. No mastoid tenderness. Nose: Nose normal and midline. Eyes: Conjunctivae and EOM are normal. Pupils are equal, round, and reactive to light.    Neck: Normal range of motion. Neck supple. Throat: Posterior pharynx is mildly erythematous with no tonsillar swelling or exudates. Uvula midline. Airway patent. Cardiovascular: Normal rate, regular rhythm, normal heart sounds and intact distal pulses. Pulmonary/Chest: Effort normal and breath sounds normal. No respiratory distress. No wheezes. No rales. No chest tenderness. no rhonchi. No resp distress. Abdominal: Soft. Bowel sounds are normal. No distension and no mass. There is no tenderness. There is no rebound and no guarding. Musculoskeletal: Normal range of motion. Neurological: Alert and oriented to person, place, and time. GCS score is 15. Skin: Skin is warm and dry. No rash noted. No erythema. No pallor. Psychiatric: Mood, memory, affect and judgment normal.           DIAGNOSTIC RESULTS     EKG: All EKG's are interpreted by the Emergency Department Physician who either signs or Co-signs this chart in the absence of a cardiologist.        RADIOLOGY:   All plain film, CT, MRI, and formal ultrasound images (except ED bedside ultrasound) are read by the radiologist, see reports below, unless otherwise noted in MDM or here. XR CHEST PORTABLE   Final Result   No acute cardiopulmonary findings             No results found. LABS:  Labs Reviewed   STREP SCREEN GROUP A THROAT       All other labs were within normal range or not returned as of this dictation.     EMERGENCY DEPARTMENT COURSE and DIFFERENTIAL DIAGNOSIS/MDM:   Vitals:    Vitals:    09/21/20 1419   BP: 139/74   Pulse: 76   Resp: 16   Temp: 97.8 °F (36.6 °C)   TempSrc: Oral   SpO2: 100%   Weight: 214 lb (97.1 kg)   Height: 5' 1\" (1.549 m)         Patient instructed to return to the emergency room if symptoms worsen, return, or any other concern right away which is agreed by the patient    ED MEDS:  Orders Placed This Encounter   Medications    ibuprofen (ADVIL;MOTRIN) tablet 800 mg    predniSONE (DELTASONE) 20 MG tablet     Sig: Take 2 tablets by mouth daily for 5 days     Dispense:  10 tablet     Refill:  0    guaiFENesin (MUCINEX) 600 MG extended release tablet     Sig: Take 1 tablet by mouth 2 times daily for 7 days     Dispense:  14 tablet     Refill:  0         CONSULTS:  None    PROCEDURES:  None      FINAL IMPRESSION      1. Bronchitis          DISPOSITION/PLAN   DISPOSITION Decision To Discharge    PATIENT REFERRED TO:  Chantal Garner MD  [address] [facility]  [address] [facility] [address] [address] [place] [address]  [phone]          DISCHARGE MEDICATIONS:  New Prescriptions    GUAIFENESIN (MUCINEX) 600 MG EXTENDED RELEASE TABLET    Take 1 tablet by mouth 2 times daily for 7 days    PREDNISONE (DELTASONE) 20 MG TABLET    Take 2 tablets by mouth daily for 5 days         Summation      Patient Course:      Sore throat, ear pain, congestion, cough, sob x 2 weeks. Vitals are stable. Pt is well appearing. No resp distress. Lungs are clear. Will get strap, chest xray. Strep is negative. Chest xray is unremarkable. Suspect viral illness. Did recommend pt get outpatient testing for covid. Will dc home with prednisone, mucinex. Pt works in [employer], work note provided. Steps to help prevent the spread of COVID-19 if you are sick  SOURCE - https://mccle[place]and-[place].info/. html     Stay home except to get medical care   ; Stay home: People who are mildly ill with COVID-19 are able to isolate at home during their illness.  You should restrict activities outside your home, except for getting medical care.   ; Avoid public areas: Do not go to work, school, or public areas.   ; Avoid public transportation: Avoid using public transportation, ride-sharing, or taxis.  ; Separate yourself from other people and animals in your home   ; Stay away from others: As much as possible, you should stay in a specific room and away from other people in your home. Also, you should use a separate bathroom, if available.   ; Limit contact with pets & animals: You should restrict contact with pets and other animals while you are sick with COVID-19, just like you would around other people. Although there have not been reports of pets or other animals becoming sick with COVID-19, it is still recommended that people sick with COVID-19 limit contact with animals until more information is known about the virus. ; When possible, have another member of your household care for your animals while you are sick. If you are sick with COVID-19, avoid contact with your pet, including petting, snuggling, being kissed or licked, and sharing food. If you must care for your pet or be around animals while you are sick, wash your hands before and after you interact with pets and wear a facemask. See COVID-19 and Animals for more information. Other considerations   The ill person should eat/be fed in their room if possible. Non-disposable  items used should be handled with gloves and washed with hot water or in a . Clean hands after handling used  items.  If possible, dedicate a lined trash can for the ill person. Use gloves when removing garbage bags, handling, and disposing of trash. Wash hands after handling or disposing of trash.  Consider consulting with your local health department about trash disposal guidance if available. Information for Household Members and Caregivers of Someone who is Sick   Call ahead before visiting your doctor   Call ahead: If you have a medical appointment, call the healthcare provider and tell them that you have or may have COVID-19. This will help the healthcare provider's office take steps to keep other people from getting infected or exposed. Wear a facemask if you are sick   ;  If you are sick: You should wear a facemask when you are around other people (e.g., sharing a room or vehicle) or pets and before you enter a healthcare provider's office. ; If you are caring for others: If the person who is sick is not able to wear a facemask (for example, because it causes trouble breathing), then people who live with the person who is sick should not stay in the same room with them, or they should wear a facemask if they enter a room with the person who is sick. Cover your coughs and sneezes   ; Cover: Cover your mouth and nose with a tissue when you cough or sneeze.   ; Dispose: Throw used tissues in a lined trash can.   ; Wash hands: Immediately wash your hands with soap and water for at least 20 seconds or, if soap and water are not available, clean your hands with an alcohol-based hand  that contains at least 60% alcohol. Clean your hands often   ; Wash hands: Wash your hands often with soap and water for at least 20 seconds, especially after blowing your nose, coughing, or sneezing; going to the bathroom; and before eating or preparing food.   ; Hand : If soap and water are not readily available, use an alcohol-based hand  with at least 60% alcohol, covering all surfaces of your hands and rubbing them together until they feel dry.   ; Soap and water: Soap and water are the best option if hands are visibly dirty.   ; Avoid touching: Avoid touching your eyes, nose, and mouth with unwashed hands. Handwashing Tips   ; Wet your hands with clean, running water (warm or cold), turn off the tap, and apply soap.  ; Lather your hands by rubbing them together with the soap. Lather the backs of your hands, between your fingers, and under your nails. ; Scrub your hands for at least 20 seconds. Need a timer? Hum the [place] from beginning to end twice.  ; Rinse your hands well under clean, running water.  ; Dry your hands using a clean towel or air dry them. Avoid sharing personal household items   ; Do not share:  You should not share dishes, drinking glasses, cups, eating utensils, towels, or bedding with other people or pets in your home.   ; Wash thoroughly after use: After using these items, they should be washed thoroughly with soap and water. Clean all high-touch surfaces everyday   ; Clean and disinfect: Practice routine cleaning of high touch surfaces.  ; High touch surfaces include counters, tabletops, doorknobs, bathroom fixtures, toilets, phones, keyboards, tablets, and bedside tables.  ; Disinfect areas with bodily fluids: Also, clean any surfaces that may have blood, stool, or body fluids on them.   ; Household : Use a household cleaning spray or wipe, according to the label instructions. Labels contain instructions for safe and effective use of the cleaning product including precautions you should take when applying the product, such as wearing gloves and making sure you have good ventilation during use of the product. Monitor your symptoms   Seek medical attention: Seek prompt medical attention if your illness is worsening     (e.g., difficulty breathing).   ; Call your doctor: Before seeking care, call your healthcare provider and tell them that you have, or are being evaluated for, COVID-19.   ; Wear a facemask when sick: Put on a facemask before you enter the facility. These steps will help the healthcare provider's office to keep other people in the office or waiting room from getting infected or exposed. ; Alert health department: Ask your healthcare provider to call the local or state health department. Persons who are placed under active monitoring or facilitated self-monitoring should follow instructions provided by their local health department or occupational health professionals, as appropriate.  ; Call 911 if you have a medical emergency: If you have a medical emergency and need to call 911, notify the dispatch personnel that you have, or are being evaluated for COVID-19.  If possible, put on a facemask before emergency medical services arrive. Discussed results and plan with the pt. They expressed appropriate understanding. Pt given close follow up, supportive care instructions and strict return instructions at the bedside. ED Medications administered this visit:    Medications   ibuprofen (ADVIL;MOTRIN) tablet 800 mg (800 mg Oral Given 9/21/20 0019)       New Prescriptions from this visit:    New Prescriptions    GUAIFENESIN (MUCINEX) 600 MG EXTENDED RELEASE TABLET    Take 1 tablet by mouth 2 times daily for 7 days    PREDNISONE (DELTASONE) 20 MG TABLET    Take 2 tablets by mouth daily for 5 days       Follow-up:  Gerri Gutierrez MD  [address] [place] [address] Dr Carranza [id number] [address]  [place] [address]  [phone]            Final Impression:   1.  Bronchitis               (Please note that portions of this note were completed with a voice recognition program.  Efforts were made to edit the dictations but occasionally words are mis-transcribed.)      (Please note that portions of this note were completed with a voice recognition program.  Efforts were made to edit the dictations but occasionally words are mis-transcribed.)    Tami Dimas, [address] [facility]  09/21/20 9929

## 2020-09-22 NOTE — ED PROVIDER NOTES
[address]  eMERGENCY dEPARTMENT eNCOUnter   Independent Attestation     Pt Name: Luis A Valdivia  MRN: [id number]  [place] 1995  Date of evaluation: 9/21/20   Luis A Valdivia is a 31 y.o. female who presents with Cough (patient reports symptom onset was 2 weeks ago. Patient reports that she was seen at [facility], but was not given antibiotics. Patient reports that \"everything is getting worse. \"); Shortness of Breath; Pharyngitis; and Otalgia (bilateral)    Vitals:   Vitals:    09/21/20 1419   BP: 139/74   Pulse: 76   Resp: 16   Temp: 97.8 °F (36.6 °C)   TempSrc: Oral   SpO2: 100%   Weight: 214 lb (97.1 kg)   Height: 5' 1\" (1.549 m)     Impression:   1. Bronchitis      I was personally available for consultation in the Emergency Department. I have reviewed the chart and agree with the documentation as recorded by the [facility], including the assessment, treatment plan and disposition.   Dipesh Pizano MD  Attending Emergency  Physician                 Dipesh Pizano MD  09/22/20 0632

## 2020-10-06 ENCOUNTER — OFFICE VISIT (OUTPATIENT)
Dept: OBGYN CLINIC | Age: 25
End: 2020-10-06
Payer: MEDICARE

## 2020-10-06 VITALS
WEIGHT: 216 LBS | BODY MASS INDEX: 40.78 KG/M2 | HEART RATE: 76 BPM | DIASTOLIC BLOOD PRESSURE: 76 MMHG | TEMPERATURE: 98 F | HEIGHT: 61 IN | SYSTOLIC BLOOD PRESSURE: 114 MMHG

## 2020-10-06 PROCEDURE — G8484 FLU IMMUNIZE NO ADMIN: HCPCS | Performed by: OBSTETRICS & GYNECOLOGY

## 2020-10-06 PROCEDURE — G8417 CALC BMI ABV UP PARAM F/U: HCPCS | Performed by: OBSTETRICS & GYNECOLOGY

## 2020-10-06 PROCEDURE — 1036F TOBACCO NON-USER: CPT | Performed by: OBSTETRICS & GYNECOLOGY

## 2020-10-06 PROCEDURE — 99214 OFFICE O/P EST MOD 30 MIN: CPT | Performed by: OBSTETRICS & GYNECOLOGY

## 2020-10-06 PROCEDURE — G8427 DOCREV CUR MEDS BY ELIG CLIN: HCPCS | Performed by: OBSTETRICS & GYNECOLOGY

## 2020-10-06 NOTE — PROGRESS NOTES
Cesar John Dee  10/6/2020      Carol Hayes is a 31 y.o. female [id number]      The patient was seen today. She was here to follow-up regarding her labs and diagnostics ordered at her last visit for the diagnosis of:    ICD-10-CM    1. Fatigue, unspecified type  R53.83 CBC Auto Differential     TSH with Reflex     HCG, Quantitative, Pregnancy     Follicle Stimulating Hormone     Luteinizing Hormone     Glucose, Fasting     Insulin, total     Hemoglobin A1C     ALT     AST     BUN & Creatinine     T4, Free   2. Weight gain  R63.5 CBC Auto Differential     TSH with Reflex     HCG, Quantitative, Pregnancy     Follicle Stimulating Hormone     Luteinizing Hormone     Glucose, Fasting     Insulin, total     Hemoglobin A1C     ALT     AST     BUN & Creatinine     T4, Free       Her bowels are regular and she is voiding without difficulty. She has cramps with menses. She is trying to conceive since  by . Same partner no lubrication no med changes for either no job changes. Cycles are regular. some increase in pt fatigue. Coital activity 15x/ mo. 20 lb weight gain over 6 years. She is on PNV and denies NTD or AWD hx's self family or sig other family. Pt has one other child with sig other. Declined discharge monogamous. Hx of chlamydia in her last pregnancy.       Past Medical History:   Diagnosis Date    Anemia complicating pregnancy in first trimester 6/10/2014    Benign essential hypertension antepartum 2014    History of chlamydia     HSV (herpes simplex virus) infection IGM I/II 2014    TSH deficiency 6/10/2014         Past Surgical History:   Procedure Laterality Date     SECTION           Family History   Problem Relation Age of Onset    Hypertension Maternal Grandmother     High Blood Pressure Maternal Grandmother     Kidney Disease Maternal Grandmother     Lung Cancer Maternal Grandfather     Asthma Brother     Other Sister         fire    Mental Illness Father  Breast Cancer Neg Hx     Cancer Neg Hx     Colon Cancer Neg Hx     Diabetes Neg Hx     Eclampsia Neg Hx     Ovarian Cancer Neg Hx      Labor Neg Hx     Spont Abortions Neg Hx     Stroke Neg Hx          Social History     Tobacco Use    Smoking status: Never Smoker    Smokeless tobacco: Never Used   Substance Use Topics    Alcohol use: No     Alcohol/week: 0.0 standard drinks    Drug use: No         MEDICATIONS:  Current Outpatient Medications   Medication Sig Dispense Refill    Prenatal Vit-Fe Fumarate-FA (PRENATAL VITAMINS) 28-0.8 MG TABS Take 1 tablet by mouth daily 30 tablet 12     No current facility-administered medications for this visit. ALLERGIES:  Allergies as of 10/06/2020    (No Known Allergies)         Blood pressure 114/76, pulse 76, temperature 98 °F (36.7 °C), height 5' 1\" (1.549 m), weight 216 lb (98 kg), last menstrual period 2020, not currently breastfeeding. Abdomen: Soft non-tender; good bowel sounds. No guarding, rebound or rigidity. No CVA tenderness bilaterally. Extremities: No calf tenderness, DTR 2/4, and No edema bilaterally    Pelvic: Declined         Diagnostics:  Xr Chest Portable    Result Date: 2020  EXAMINATION: ONE XRAY VIEW OF THE CHEST 2020 2:50 pm COMPARISON: Baseline examination HISTORY: ORDERING SYSTEM PROVIDED HISTORY: cough, sob TECHNOLOGIST PROVIDED HISTORY: cough, sob Reason for Exam: cough and sob x 2 weeks Acuity: Acute Type of Exam: Initial FINDINGS: Normal cardiopericardial silhouette No significant pleural, parenchymal or mediastinal findings     No acute cardiopulmonary findings         Lab Results:  Results for orders placed or performed during the hospital encounter of 20   STREP SCREEN GROUP A THROAT    Specimen: Throat   Result Value Ref Range    Specimen Description . THROAT     Special Requests NOT REPORTED     Direct Exam       Rapid Strep A negative.  A negative Rapid Group A Strep Screen result does not rule out the possibility of Group A Streptococci in the specimen. A Group A Strep DNA test is available upon request.           Assessment:   Diagnosis Orders   1. Fatigue, unspecified type  CBC Auto Differential    TSH with Reflex    HCG, Quantitative, Pregnancy    Follicle Stimulating Hormone    Luteinizing Hormone    Glucose, Fasting    Insulin, total    Hemoglobin A1C    ALT    AST    BUN & Creatinine    T4, Free   2.  Weight gain  CBC Auto Differential    TSH with Reflex    HCG, Quantitative, Pregnancy    Follicle Stimulating Hormone    Luteinizing Hormone    Glucose, Fasting    Insulin, total    Hemoglobin A1C    ALT    AST    BUN & Creatinine    T4, Free     Chief Complaint   Patient presents with    Other     discuss fertility          Patient Active Problem List    Diagnosis Date Noted    PLTCS 9/26/14 Baby A: M Apg 3/9 Wt: 3#11, Baby B: M Apg 5/9 Wt: 3#12 09/27/2014     Priority: High    Carpal tunnel syndrome 09/05/2014     Priority: High     Will try wrist braces for symptom relief      Benign essential hypertension, antepartum 09/05/2014     Priority: High     24hr urine protein = 280 9/5  S/p celestone 9/5 & 9/6    Updating deleted diagnoses      HSV infection IGM I/II + 07/22/2014     Priority: High     Negative IGG I & II  TX Valtrex 1000 mg/dy x 10 days then 500 mg /dy throughout pregnancy    Denies ever having lesions or prodrome    Repeat IGG type specific titres at 28 wks-Slip given      TSH deficiency 06/10/2014     Priority: High     TSH 6/10/14: 0.17  TSH 9/5/14: 1.24        Anemia 06/10/2014     Priority: High     6/10/2014 Ferrous Sulfate 325 mg # 30 one po daily with 6 RF      History of chlamydia      Priority: Medium     5/12/14 Negative  4/14/2014 Will treat today RTO in 4 weeks for test of cure 5/2014 NEG/NEG  Abstain  Partner needs treated  36 weeks repeat cultures GC and Chlamydia, HIV, RPR      BV (bacterial vaginosis) 04/14/2014     Priority: Low     Treated in ED 4/14 PLAN:  Return in about 1 week (around 10/13/2020) for FU Labs/Diagnostics and Care Plan. Cultures at fu at pt request  Return to the office in 1 weeks. Counseled on preventative health maintenance follow-up. Orders Placed This Encounter   Procedures    CBC Auto Differential     Standing Status:   Future     Standing Expiration Date:   10/6/2021    TSH with Reflex     Standing Status:   Future     Standing Expiration Date:   10/6/2021    HCG, Quantitative, Pregnancy     Standing Status:   Future     Standing Expiration Date:   04/3/6866   [place] Self Follicle Stimulating Hormone     Standing Status:   Future     Standing Expiration Date:   10/6/2021    Luteinizing Hormone     Standing Status:   Future     Standing Expiration Date:   10/6/2021    Glucose, Fasting     Standing Status:   Future     Standing Expiration Date:   10/6/2021    Insulin, total     Patient must be fasting for 12-14 hrs     Standing Status:   Future     Standing Expiration Date:   11/5/2020    Hemoglobin A1C     Standing Status:   Future     Standing Expiration Date:   10/6/2021    ALT     Standing Status:   Future     Standing Expiration Date:   10/6/2021    AST     Standing Status:   Future     Standing Expiration Date:   10/6/2021    BUN & Creatinine     Standing Status:   Future     Standing Expiration Date:   10/6/2021    T4, Free     Standing Status:   Future     Standing Expiration Date:   10/6/2021       Patient was seen with total face to face time of 25 minutes. More than 50% of this visit was counseling and education regarding The primary encounter diagnosis was Fatigue, unspecified type. A diagnosis of Weight gain was also pertinent to this visit. and Other (discuss fertility )   as well as  counseling on preventative health maintenance follow-up.

## 2020-10-07 ENCOUNTER — HOSPITAL ENCOUNTER (OUTPATIENT)
Age: 25
Setting detail: SPECIMEN
Discharge: HOME | End: 2020-10-07
Payer: MEDICARE

## 2020-10-07 LAB
ABSOLUTE EOS #: 0.17 K/UL (ref 0–0.4)
ABSOLUTE IMMATURE GRANULOCYTE: ABNORMAL K/UL (ref 0–0.3)
ABSOLUTE LYMPH #: 2.32 K/UL (ref 1–4.8)
ABSOLUTE MONO #: 0.77 K/UL (ref 0.1–1.3)
ALT SERPL-CCNC: 14 U/L (ref 5–33)
AST SERPL-CCNC: 15 U/L
BASOPHILS # BLD: 1 % (ref 0–2)
BASOPHILS ABSOLUTE: 0.09 K/UL (ref 0–0.2)
BUN BLDV-MCNC: 8 MG/DL (ref 6–20)
CREAT SERPL-MCNC: 0.44 MG/DL (ref 0.5–0.9)
DIFFERENTIAL TYPE: ABNORMAL
EOSINOPHILS RELATIVE PERCENT: 2 % (ref 0–4)
FOLLICLE STIMULATING HORMONE: 2.9 U/L (ref 1.7–21.5)
GFR AFRICAN AMERICAN: >60 ML/MIN
GFR NON-AFRICAN AMERICAN: >60 ML/MIN
GFR SERPL CREATININE-BSD FRML MDRD: ABNORMAL ML/MIN/{1.73_M2}
GFR SERPL CREATININE-BSD FRML MDRD: ABNORMAL ML/MIN/{1.73_M2}
GLUCOSE FASTING: 86 MG/DL (ref 70–99)
HCG QUANTITATIVE: <1 IU/L
HCT VFR BLD CALC: 35.8 % (ref 36–46)
HEMOGLOBIN: 11.3 G/DL (ref 12–16)
IMMATURE GRANULOCYTES: ABNORMAL %
INSULIN COMMENT: NORMAL
INSULIN REFERENCE RANGE:: NORMAL
INSULIN: 16.7 MU/L
LH: 10.2 U/L (ref 1–95.6)
LYMPHOCYTES # BLD: 27 % (ref 24–44)
MCH RBC QN AUTO: 25.9 PG (ref 26–34)
MCHC RBC AUTO-ENTMCNC: 31.6 G/DL (ref 31–37)
MCV RBC AUTO: 81.8 FL (ref 80–100)
MONOCYTES # BLD: 9 % (ref 1–7)
MORPHOLOGY: ABNORMAL
MORPHOLOGY: ABNORMAL
NRBC AUTOMATED: ABNORMAL PER 100 WBC
PDW BLD-RTO: 15.4 % (ref 11.5–14.9)
PLATELET # BLD: 293 K/UL (ref 150–450)
PLATELET ESTIMATE: ABNORMAL
PMV BLD AUTO: 9.3 FL (ref 6–12)
RBC # BLD: 4.38 M/UL (ref 4–5.2)
RBC # BLD: ABNORMAL 10*6/UL
SEG NEUTROPHILS: 61 % (ref 36–66)
SEGMENTED NEUTROPHILS ABSOLUTE COUNT: 5.25 K/UL (ref 1.3–9.1)
THYROXINE, FREE: 0.94 NG/DL (ref 0.93–1.7)
TSH SERPL DL<=0.05 MIU/L-ACNC: 0.93 MIU/L (ref 0.3–5)
WBC # BLD: 8.6 K/UL (ref 3.5–11)
WBC # BLD: ABNORMAL 10*3/UL

## 2020-10-07 PROCEDURE — 85025 COMPLETE CBC W/AUTO DIFF WBC: CPT

## 2020-10-07 PROCEDURE — 82565 ASSAY OF CREATININE: CPT

## 2020-10-07 PROCEDURE — 82947 ASSAY GLUCOSE BLOOD QUANT: CPT

## 2020-10-07 PROCEDURE — 36415 COLL VENOUS BLD VENIPUNCTURE: CPT

## 2020-10-07 PROCEDURE — 83036 HEMOGLOBIN GLYCOSYLATED A1C: CPT

## 2020-10-07 PROCEDURE — 83002 ASSAY OF GONADOTROPIN (LH): CPT

## 2020-10-07 PROCEDURE — 84520 ASSAY OF UREA NITROGEN: CPT

## 2020-10-07 PROCEDURE — 83001 ASSAY OF GONADOTROPIN (FSH): CPT

## 2020-10-07 PROCEDURE — 84702 CHORIONIC GONADOTROPIN TEST: CPT

## 2020-10-07 PROCEDURE — 84443 ASSAY THYROID STIM HORMONE: CPT

## 2020-10-07 PROCEDURE — 84450 TRANSFERASE (AST) (SGOT): CPT

## 2020-10-07 PROCEDURE — 84460 ALANINE AMINO (ALT) (SGPT): CPT

## 2020-10-07 PROCEDURE — 84439 ASSAY OF FREE THYROXINE: CPT

## 2020-10-07 PROCEDURE — 83525 ASSAY OF INSULIN: CPT

## 2020-10-08 ENCOUNTER — TELEPHONE (OUTPATIENT)
Dept: OBGYN CLINIC | Age: 25
End: 2020-10-08

## 2020-10-08 LAB
ESTIMATED AVERAGE GLUCOSE: 114 MG/DL
HBA1C MFR BLD: 5.6 % (ref 4–6)

## 2020-10-08 NOTE — TELEPHONE ENCOUNTER
----- Message from JUNG Castelan NP sent at 10/8/2020  8:51 AM EDT -----  Labs indicate PCOS  Dr Ronald Montero wanted her to follow up in one week for results

## 2020-10-08 NOTE — TELEPHONE ENCOUNTER
Attempted to notify patient of results and plan of care. No answer, voicemail left, awaiting call back.

## 2020-10-15 ENCOUNTER — OFFICE VISIT (OUTPATIENT)
Dept: OBGYN CLINIC | Age: 25
End: 2020-10-15
Payer: MEDICARE

## 2020-10-15 ENCOUNTER — HOSPITAL ENCOUNTER (OUTPATIENT)
Age: 25
Setting detail: SPECIMEN
Discharge: HOME | End: 2020-10-15
Payer: MEDICARE

## 2020-10-15 VITALS
TEMPERATURE: 98 F | BODY MASS INDEX: 40.78 KG/M2 | HEART RATE: 85 BPM | DIASTOLIC BLOOD PRESSURE: 80 MMHG | WEIGHT: 216 LBS | SYSTOLIC BLOOD PRESSURE: 120 MMHG | HEIGHT: 61 IN

## 2020-10-15 PROBLEM — E28.2 PCO (POLYCYSTIC OVARIES): Status: ACTIVE | Noted: 2020-10-15

## 2020-10-15 LAB
DIRECT EXAM: NORMAL
Lab: NORMAL
SPECIMEN DESCRIPTION: NORMAL

## 2020-10-15 PROCEDURE — 87660 TRICHOMONAS VAGIN DIR PROBE: CPT

## 2020-10-15 PROCEDURE — 1036F TOBACCO NON-USER: CPT | Performed by: OBSTETRICS & GYNECOLOGY

## 2020-10-15 PROCEDURE — 87510 GARDNER VAG DNA DIR PROBE: CPT

## 2020-10-15 PROCEDURE — 87480 CANDIDA DNA DIR PROBE: CPT

## 2020-10-15 PROCEDURE — 87591 N.GONORRHOEAE DNA AMP PROB: CPT

## 2020-10-15 PROCEDURE — 99214 OFFICE O/P EST MOD 30 MIN: CPT | Performed by: OBSTETRICS & GYNECOLOGY

## 2020-10-15 PROCEDURE — 87491 CHLMYD TRACH DNA AMP PROBE: CPT

## 2020-10-15 PROCEDURE — G8417 CALC BMI ABV UP PARAM F/U: HCPCS | Performed by: OBSTETRICS & GYNECOLOGY

## 2020-10-15 PROCEDURE — G8427 DOCREV CUR MEDS BY ELIG CLIN: HCPCS | Performed by: OBSTETRICS & GYNECOLOGY

## 2020-10-15 PROCEDURE — G8484 FLU IMMUNIZE NO ADMIN: HCPCS | Performed by: OBSTETRICS & GYNECOLOGY

## 2020-10-15 RX ORDER — METFORMIN HYDROCHLORIDE 500 MG/1
500 TABLET, EXTENDED RELEASE ORAL
Qty: 30 TABLET | Refills: 5 | Status: SHIPPED | OUTPATIENT
Start: 2020-10-15 | End: 2021-04-22 | Stop reason: SDUPTHER

## 2020-10-15 NOTE — PATIENT INSTRUCTIONS
or play tennis or team sports. · For hair growth you don't want, try bleaching, plucking, electrolysis, or laser therapy. · Acne can be treated with over-the-counter medicines. Look for ones that have benzoyl peroxide or salicylic acid in them. When should you call for help? Call your doctor now or seek immediate medical care if:    · You have severe vaginal bleeding.     · You have new or worse belly or pelvic pain. Watch closely for changes in your health, and be sure to contact your doctor if:    · You do not get better as expected.     · You have unusual vaginal bleeding. Where can you learn more? Go to [email or web address] and sign in to your Ingresse account. Enter X052 in the Microstaq box to learn more about \"Polycystic Ovary Syndrome: Care Instructions. \"     If you do not have an account, please click on the \"Sign Up Now\" link. Current as of: November 8, 2019               Content Version: 12.6  © 1115-4545 [employer]. Care instructions adapted under license by [facility] ([facility]). If you have questions about a medical condition or this instruction, always ask your healthcare professional. [address] any warranty or liability for your use of this information.

## 2020-10-15 NOTE — PROGRESS NOTES
Fred Price  10/15/2020      Orbisonia Jonathan is a 31 y.o. female       The patient was seen today. She was here to follow-up regarding her labs and diagnostics ordered at her last visit for the diagnosis of:    ICD-10-CM    1. Screening for STD (sexually transmitted disease)  Z11.3 C.trachomatis N.gonorrhoeae DNA     VAGINITIS DNA PROBE   2. Fatigue, unspecified type  R53.83 Mononucleosis Screen   3. Weight gain  R63.5    4. PCO (polycystic ovaries)  E28.2 metFORMIN (GLUCOPHAGE-XR) 500 MG extended release tablet   5. High blood monocyte count  D72.828 Mononucleosis Screen       Her bowels are regular and she is voiding without difficulty.        Past Medical History:   Diagnosis Date    Anemia complicating pregnancy in first trimester 6/10/2014    Benign essential hypertension antepartum 2014    History of chlamydia     HSV (herpes simplex virus) infection IGM I/II 2014    TSH deficiency 6/10/2014         Past Surgical History:   Procedure Laterality Date     SECTION           Family History   Problem Relation Age of Onset    Hypertension Maternal Grandmother     High Blood Pressure Maternal Grandmother     Kidney Disease Maternal Grandmother     Lung Cancer Maternal Grandfather     Asthma Brother     Other Sister         fire    Mental Illness Father     Breast Cancer Neg Hx     Cancer Neg Hx     Colon Cancer Neg Hx     Diabetes Neg Hx     Eclampsia Neg Hx     Ovarian Cancer Neg Hx      Labor Neg Hx     Spont Abortions Neg Hx     Stroke Neg Hx          Social History     Tobacco Use    Smoking status: Never Smoker    Smokeless tobacco: Never Used   Substance Use Topics    Alcohol use: No     Alcohol/week: 0.0 standard drinks    Drug use: No         MEDICATIONS:  Current Outpatient Medications   Medication Sig Dispense Refill    metFORMIN (GLUCOPHAGE-XR) 500 MG extended release tablet Take 1 tablet by mouth daily (with breakfast) 30 tablet 5  Prenatal Vit-Fe Fumarate-FA (PRENATAL VITAMINS) 28-0.8 MG TABS Take 1 tablet by mouth daily 30 tablet 12     No current facility-administered medications for this visit. ALLERGIES:  Allergies as of 10/15/2020    (No Known Allergies)         Blood pressure 120/80, pulse 85, temperature 98 °F (36.7 °C), height 5' 1\" (1.549 m), weight 216 lb (98 kg), last menstrual period 09/11/2020, not currently breastfeeding. Abdomen: Soft non-tender; good bowel sounds. No guarding, rebound or rigidity. No CVA tenderness bilaterally.     Extremities: No calf tenderness, DTR 2/4, and No edema bilaterally    Pelvic:    Spec only for cultures    Diagnostics:  Xr Chest Portable    Result Date: 9/21/2020  EXAMINATION: ONE XRAY VIEW OF THE CHEST 9/21/2020 2:50 pm COMPARISON: Baseline examination HISTORY: ORDERING SYSTEM PROVIDED HISTORY: cough, sob TECHNOLOGIST PROVIDED HISTORY: cough, sob Reason for Exam: cough and sob x 2 weeks Acuity: Acute Type of Exam: Initial FINDINGS: Normal cardiopericardial silhouette No significant pleural, parenchymal or mediastinal findings     No acute cardiopulmonary findings         Lab Results:  Results for orders placed or performed during the hospital encounter of 10/07/20   CBC Auto Differential   Result Value Ref Range    WBC 8.6 3.5 - 11.0 k/uL    RBC 4.38 4.0 - 5.2 m/uL    Hemoglobin 11.3 (L) 12.0 - 16.0 g/dL    Hematocrit 35.8 (L) 36 - 46 %    MCV 81.8 80 - 100 fL    MCH 25.9 (L) 26 - 34 pg    MCHC 31.6 31 - 37 g/dL    RDW 15.4 (H) 11.5 - 14.9 %    Platelets 410 995 - 913 k/uL    MPV 9.3 6.0 - 12.0 fL    NRBC Automated NOT REPORTED per 100 WBC    Differential Type NOT REPORTED     Immature Granulocytes NOT REPORTED 0 %    Absolute Immature Granulocyte NOT REPORTED 0.00 - 0.30 k/uL    WBC Morphology NOT REPORTED     RBC Morphology NOT REPORTED     Platelet Estimate NOT REPORTED     Seg Neutrophils 61 36 - 66 %    Lymphocytes 27 24 - 44 %    Monocytes 9 (H) 1 - 7 %    Eosinophils % 2 0 - 4 %    Basophils 1 0 - 2 %    Segs Absolute 5.25 1.3 - 9.1 k/uL    Absolute Lymph # 2.32 1.0 - 4.8 k/uL    Absolute Mono # 0.77 0.1 - 1.3 k/uL    Absolute Eos # 0.17 0.0 - 0.4 k/uL    Basophils Absolute 0.09 0.0 - 0.2 k/uL    Morphology HYPOCHROMIA PRESENT     Morphology ANISOCYTOSIS PRESENT    TSH with Reflex   Result Value Ref Range    TSH 0.93 0.30 - 5.00 mIU/L   HCG, Quantitative, Pregnancy   Result Value Ref Range    hCG Quant <1 <5 IU/L   Follicle Stimulating Hormone   Result Value Ref Range    FSH 2.9 1.7 - 21.5 U/L   Luteinizing Hormone   Result Value Ref Range    LH 10.2 1.0 - 95.6 U/L   Glucose, Fasting   Result Value Ref Range    Glucose, Fasting 86 70 - 99 mg/dL   Insulin, total   Result Value Ref Range    Insulin Comment FASTING     Insulin 16.7 mU/L    Insulin Reference Range:         Hemoglobin A1C   Result Value Ref Range    Hemoglobin A1C 5.6 4.0 - 6.0 %    Estimated Avg Glucose 114 mg/dL   ALT   Result Value Ref Range    ALT 14 5 - 33 U/L   AST   Result Value Ref Range    AST 15 <32 U/L   BUN & Creatinine   Result Value Ref Range    BUN 8 6 - 20 mg/dL    CREATININE 0.44 (L) 0.50 - 0.90 mg/dL    GFR Non-African American >60 >60 mL/min    GFR African American >60 >60 mL/min    GFR Comment          GFR Staging NOT REPORTED    T4, Free   Result Value Ref Range    Thyroxine, Free 0.94 0.93 - 1.70 ng/dL           Assessment:   Diagnosis Orders   1. Screening for STD (sexually transmitted disease)  C.trachomatis N.gonorrhoeae DNA    VAGINITIS DNA PROBE   2. Fatigue, unspecified type  Mononucleosis Screen   3. Weight gain     4. PCO (polycystic ovaries)  metFORMIN (GLUCOPHAGE-XR) 500 MG extended release tablet   5.  High blood monocyte count  Mononucleosis Screen     Chief Complaint   Patient presents with    Follow-up         Patient Active Problem List    Diagnosis Date Noted    PLTCS 9/26/14 Baby A: M Apg 3/9 Wt: 3#11, Baby B: M Apg 5/9 Wt: 3#12 09/27/2014     Priority: High    Carpal tunnel syndrome 09/05/2014     Priority: High     Will try wrist braces for symptom relief      Benign essential hypertension, antepartum 09/05/2014     Priority: High     24hr urine protein = 280 9/5  S/p celestone 9/5 & 9/6    Updating deleted diagnoses      HSV infection IGM I/II + 07/22/2014     Priority: High     Negative IGG I & II  TX Valtrex 1000 mg/dy x 10 days then 500 mg /dy throughout pregnancy    Denies ever having lesions or prodrome    Repeat IGG type specific titres at 28 wks-Slip given      TSH deficiency 06/10/2014     Priority: High     TSH 6/10/14: 0.17  TSH 9/5/14: 1.24        Anemia 06/10/2014     Priority: High     6/10/2014 Ferrous Sulfate 325 mg # 30 one po daily with 6 RF      History of chlamydia      Priority: Medium     5/12/14 Negative  4/14/2014 Will treat today RTO in 4 weeks for test of cure 5/2014 NEG/NEG  Abstain  Partner needs treated  36 weeks repeat cultures GC and Chlamydia, HIV, RPR      BV (bacterial vaginosis) 04/14/2014     Priority: Low     Treated in ED 4/14      PCO (polycystic ovaries) 10/15/2020       PLAN:  Return in about 3 months (around 1/15/2021) for Follow-Up On Current Problem. Cultures collected  Monospot and fu with PCP  Start Metformin 500 mg XR q am for PCOs  RTO12 weeks  Counseled on preventative health maintenance follow-up. Orders Placed This Encounter   Procedures    C.trachomatis N.gonorrhoeae DNA     Standing Status:   Future     Standing Expiration Date:   10/15/2021    VAGINITIS DNA PROBE     Standing Status:   Future     Standing Expiration Date:   10/15/2021    Mononucleosis Screen     Standing Status:   Future     Standing Expiration Date:   10/15/2021       Patient was seen with total face to face time of 25 minutes. More than 50% of this visit was counseling and education regarding The primary encounter diagnosis was Screening for STD (sexually transmitted disease).  Diagnoses of Fatigue, unspecified type, Weight gain, PCO (polycystic ovaries), and High blood monocyte count were also pertinent to this visit. and Follow-up   as well as  counseling on preventative health maintenance follow-up.

## 2020-10-20 LAB
C TRACH DNA GENITAL QL NAA+PROBE: NEGATIVE
N. GONORRHOEAE DNA: NEGATIVE
SPECIMEN DESCRIPTION: NORMAL

## 2020-12-28 ENCOUNTER — TELEPHONE (OUTPATIENT)
Dept: OBGYN CLINIC | Age: 25
End: 2020-12-28

## 2020-12-28 NOTE — TELEPHONE ENCOUNTER
Pt called stating she was due for menses on December 22 but has not started. Pt requesting quant to rule out pregnancy. Order in epic. Pt instructed to call office for results and recommendations once blood work (quant) is completed.

## 2021-01-19 ENCOUNTER — OFFICE VISIT (OUTPATIENT)
Dept: OBGYN CLINIC | Age: 26
End: 2021-01-19
Payer: MEDICARE

## 2021-01-19 VITALS
DIASTOLIC BLOOD PRESSURE: 68 MMHG | WEIGHT: 214.8 LBS | SYSTOLIC BLOOD PRESSURE: 100 MMHG | HEART RATE: 76 BPM | TEMPERATURE: 97.5 F | HEIGHT: 61 IN | BODY MASS INDEX: 40.55 KG/M2 | OXYGEN SATURATION: 99 %

## 2021-01-19 DIAGNOSIS — E28.2 PCO (POLYCYSTIC OVARIES): Primary | ICD-10-CM

## 2021-01-19 DIAGNOSIS — N92.6 IRREGULAR MENSES: ICD-10-CM

## 2021-01-19 PROCEDURE — 1036F TOBACCO NON-USER: CPT | Performed by: OBSTETRICS & GYNECOLOGY

## 2021-01-19 PROCEDURE — G8417 CALC BMI ABV UP PARAM F/U: HCPCS | Performed by: OBSTETRICS & GYNECOLOGY

## 2021-01-19 PROCEDURE — 99213 OFFICE O/P EST LOW 20 MIN: CPT | Performed by: OBSTETRICS & GYNECOLOGY

## 2021-01-19 PROCEDURE — G8427 DOCREV CUR MEDS BY ELIG CLIN: HCPCS | Performed by: OBSTETRICS & GYNECOLOGY

## 2021-01-19 PROCEDURE — G8484 FLU IMMUNIZE NO ADMIN: HCPCS | Performed by: OBSTETRICS & GYNECOLOGY

## 2021-01-19 RX ORDER — METFORMIN HYDROCHLORIDE 500 MG/1
TABLET, EXTENDED RELEASE ORAL
COMMUNITY
Start: 2020-10-15 | End: 2021-01-19

## 2021-01-19 RX ORDER — VITAMIN A ACETATE, .BETA.-CAROTENE, ASCORBIC ACID, CHOLECALCIFEROL, .ALPHA.-TOCOPHEROL ACETATE, DL-, THIAMINE MONONITRATE, RIBOFLAVIN, NIACINAMIDE, PYRIDOXINE HYDROCHLORIDE, FOLIC ACID, CYANOCOBALAMIN, CALCIUM CARBONATE, FERROUS FUMARATE, ZINC OXIDE, AND CUPRIC OXIDE 2000; 2000; 120; 400; 22; 1.84; 3; 20; 10; 1; 12; 200; 27; 25; 2 [IU]/1; [IU]/1; MG/1; [IU]/1; MG/1; MG/1; MG/1; MG/1; MG/1; MG/1; UG/1; MG/1; MG/1; MG/1; MG/1
TABLET ORAL
COMMUNITY
Start: 2020-11-16 | End: 2022-01-17 | Stop reason: SDUPTHER

## 2021-01-19 NOTE — PROGRESS NOTES
Tg FISHER Dee  2021      Elvis Tirado is a 31 y.o. female [id number]      The patient was seen today. She was here to follow-up regarding her labs and diagnostics ordered at her last visit for the diagnosis of:    ICD-10-CM    1. PCO (polycystic ovaries)  E28.2    2. Irregular menses  N92.6        She does not have any specific chief complaint today. Her bowels are regular and she is voiding without difficulty. Onset of meds Oct 2020 cycled November and December. On PNV. NO NTD or AWD defect hx's. Hx Natural twins with  delivery.  Denies any dc      Past Medical History:   Diagnosis Date    Anemia complicating pregnancy in first trimester 6/10/2014    Benign essential hypertension antepartum 2014    History of chlamydia     HSV (herpes simplex virus) infection IGM I/II 2014    TSH deficiency 6/10/2014         Past Surgical History:   Procedure Laterality Date     SECTION           Family History   Problem Relation Age of Onset    Hypertension Maternal Grandmother     High Blood Pressure Maternal Grandmother     Kidney Disease Maternal Grandmother     Lung Cancer Maternal Grandfather     Asthma Brother     Other Sister         fire    Mental Illness Father     Breast Cancer Neg Hx     Cancer Neg Hx     Colon Cancer Neg Hx     Diabetes Neg Hx     Eclampsia Neg Hx     Ovarian Cancer Neg Hx      Labor Neg Hx     Spont Abortions Neg Hx     Stroke Neg Hx          Social History     Tobacco Use    Smoking status: Never Smoker    Smokeless tobacco: Never Used   Substance Use Topics    Alcohol use: No     Alcohol/week: 0.0 standard drinks    Drug use: No         MEDICATIONS:  Current Outpatient Medications   Medication Sig Dispense Refill    Prenatal Vit-Fe Fumarate-FA (PNV PRENATAL PLUS MULTIVITAMIN) 27-1 MG TABS TAKE 1 TABLET BY MOUTH ONE TIME A DAY  metFORMIN (GLUCOPHAGE-XR) 500 MG extended release tablet Take 1 tablet by mouth daily (with breakfast) 30 tablet 5     No current facility-administered medications for this visit. ALLERGIES:  Allergies as of 01/19/2021    (No Known Allergies)         Blood pressure 100/68, pulse 76, temperature 97.5 °F (36.4 °C), temperature source Temporal, height 5' 1\" (1.549 m), weight 214 lb 12.8 oz (97.4 kg), last menstrual period 12/17/2020, SpO2 99 %, not currently breastfeeding. Abdomen: Soft non-tender; good bowel sounds. No guarding, rebound or rigidity. No CVA tenderness bilaterally. Extremities: No calf tenderness, DTR 2/4, and No edema bilaterally    Pelvic: Declined         Diagnostics:  No results found. Lab Results:  Results for orders placed or performed during the hospital encounter of 10/15/20   C.trachomatis N.gonorrhoeae DNA    Specimen: Cervix   Result Value Ref Range    Specimen Description . CERVIX     C. trachomatis DNA NEGATIVE NEGATIVE    N. gonorrhoeae DNA NEGATIVE NEGATIVE   VAGINITIS DNA PROBE    Specimen: Vaginal   Result Value Ref Range    Specimen Description . VAGINA     Special Requests NOT REPORTED     Direct Exam NEGATIVE for Candida sp. Direct Exam NEGATIVE for Gardnerella vaginalis     Direct Exam NEGATIVE for Trichomonas vaginalis     Direct Exam       Method of testing is a DNA probe intended for detection and identification of Candida species, Gardnerella vaginalis, and Trichomonas vaginalis nucleic acid in vaginal fluid specimens from patients with symptoms of vaginitis/vaginosis. Assessment:   Diagnosis Orders   1. PCO (polycystic ovaries)     2.  Menses restarted but still irregular after amenorrhea for 6 months      Chief Complaint   Patient presents with    Follow-up         Patient Active Problem List    Diagnosis Date Noted    PLT 9/26/14 Baby A: M Apg 3/9 Wt: 3#11, Baby B: M Apg 5/9 Wt: 3#12 09/27/2014     Priority: High  Carpal tunnel syndrome 09/05/2014     Priority: High     Will try wrist braces for symptom relief      Benign essential hypertension, antepartum 09/05/2014     Priority: High     24hr urine protein = 280 9/5  S/p celestone 9/5 & 9/6    Updating deleted diagnoses      HSV infection IGM I/II + 07/22/2014     Priority: High     Negative IGG I & II  TX Valtrex 1000 mg/dy x 10 days then 500 mg /dy throughout pregnancy    Denies ever having lesions or prodrome    Repeat IGG type specific titres at 28 wks-Slip given      TSH deficiency 06/10/2014     Priority: High     TSH 6/10/14: 0.17  TSH 9/5/14: 1.24        Anemia 06/10/2014     Priority: High     6/10/2014 Ferrous Sulfate 325 mg # 30 one po daily with 6 RF      History of chlamydia      Priority: Medium     5/12/14 Negative  4/14/2014 Will treat today RTO in 4 weeks for test of cure 5/2014 NEG/NEG  Abstain  Partner needs treated  36 weeks repeat cultures GC and Chlamydia, HIV, RPR      BV (bacterial vaginosis) 04/14/2014     Priority: Low     Treated in ED 4/14      PCO (polycystic ovaries) 10/15/2020       PLAN:  Return in about 3 months (around 4/19/2021) for Follow-Up On Current Problem VIRTUAL, Follow Up Current Problem-Virtual Visit. Increase metformin XR to 500 mg bid  Return to the office in 12 weeks. virtual  Counseled on preventative health maintenance follow-up. No orders of the defined types were placed in this encounter. The patient, Michaelle Jacobo is a 31 y.o. female, was seen with a total time spent of 20 minutes for the visit on this date of service by the E/M provider. The time component had both face to face and non face to face time spent in determining the total time component. Counseling and education regarding her diagnosis listed below and her options regarding those diagnoses were also included in determining her time component. Diagnosis Orders   1. PCO (polycystic ovaries)     2.  Irregular menses

## 2021-04-22 DIAGNOSIS — E28.2 PCO (POLYCYSTIC OVARIES): ICD-10-CM

## 2021-04-22 RX ORDER — METFORMIN HYDROCHLORIDE 500 MG/1
500 TABLET, EXTENDED RELEASE ORAL
Qty: 30 TABLET | Refills: 5 | Status: SHIPPED | OUTPATIENT
Start: 2021-04-22 | End: 2021-06-04

## 2021-06-04 ENCOUNTER — TELEMEDICINE (OUTPATIENT)
Dept: OBGYN CLINIC | Age: 26
End: 2021-06-04
Payer: MEDICARE

## 2021-06-04 DIAGNOSIS — N92.6 IRREGULAR MENSES: ICD-10-CM

## 2021-06-04 DIAGNOSIS — E28.2 PCO (POLYCYSTIC OVARIES): Primary | ICD-10-CM

## 2021-06-04 PROCEDURE — G8427 DOCREV CUR MEDS BY ELIG CLIN: HCPCS | Performed by: OBSTETRICS & GYNECOLOGY

## 2021-06-04 PROCEDURE — 99213 OFFICE O/P EST LOW 20 MIN: CPT | Performed by: OBSTETRICS & GYNECOLOGY

## 2021-06-04 RX ORDER — METFORMIN HYDROCHLORIDE 500 MG/1
500 TABLET, EXTENDED RELEASE ORAL 2 TIMES DAILY
Qty: 60 TABLET | Refills: 3 | Status: SHIPPED | OUTPATIENT
Start: 2021-06-04 | End: 2021-11-19 | Stop reason: SDUPTHER

## 2021-06-04 NOTE — PROGRESS NOTES
Zoey Price  2021    Zoey Price (:  1995) has requested an audio/video evaluation for the following concern(s):    1. PCO (polycystic ovaries)    2. Irregular menses          TELEHEALTH EVALUATION -- Audio/Visual (During [id number] public health emergency)      Cinthya Graham is a 31 y.o. female       She was here to follow-up regarding her labs and diagnostics ordered at her last visit for the diagnosis of:    ICD-10-CM    1. PCO (polycystic ovaries)  E28.2 metFORMIN (GLUCOPHAGE-XR) 500 MG extended release tablet   2. Irregular menses  N92.6        Her bowels are regular and she is voiding without difficulty. We  increased her meformin to  mg XR. Her cycles are now regular. Barrier recs.       Past Medical History:   Diagnosis Date    Anemia complicating pregnancy in first trimester 6/10/2014    Benign essential hypertension antepartum 2014    History of chlamydia     HSV (herpes simplex virus) infection IGM I/II 2014    TSH deficiency 6/10/2014         Past Surgical History:   Procedure Laterality Date     SECTION           Family History   Problem Relation Age of Onset    Hypertension Maternal Grandmother     High Blood Pressure Maternal Grandmother     Kidney Disease Maternal Grandmother     Lung Cancer Maternal Grandfather     Asthma Brother     Other Sister         fire    Mental Illness Father     Breast Cancer Neg Hx     Cancer Neg Hx     Colon Cancer Neg Hx     Diabetes Neg Hx     Eclampsia Neg Hx     Ovarian Cancer Neg Hx      Labor Neg Hx     Spont Abortions Neg Hx     Stroke Neg Hx          Social History     Tobacco Use    Smoking status: Never Smoker    Smokeless tobacco: Never Used   Vaping Use    Vaping Use: Never used   Substance Use Topics    Alcohol use: No     Alcohol/week: 0.0 standard drinks    Drug use: No         MEDICATIONS:  Current Outpatient Medications   Medication Sig Dispense Refill    metFORMIN (GLUCOPHAGE-XR) 500 MG extended release tablet Take 1 tablet by mouth 2 times daily 60 tablet 3    Prenatal Vit-Fe Fumarate-FA (PNV PRENATAL PLUS MULTIVITAMIN) 27-1 MG TABS TAKE 1 TABLET BY MOUTH ONE TIME A DAY       No current facility-administered medications for this visit. ALLERGIES:  Allergies as of 06/04/2021    (No Known Allergies)         not currently breastfeeding. PE is limited due to the virtual visit    Abdomen: Soft non-tender; good bowel sounds. No guarding, rebound or rigidity. No CVA tenderness bilaterally reported when questioned. (Viewed Virtually)    Extremities: No calf tenderness, DTR 2/4, and No edema bilaterally as inspected by video and palpation by the patient (Viewed Virtually)    Pelvic: (Virtual Visit-Not Completed)    Diagnostics:  No results found. Lab Results:  Results for orders placed or performed during the hospital encounter of 10/15/20   C.trachomatis N.gonorrhoeae DNA    Specimen: Cervix   Result Value Ref Range    Specimen Description . CERVIX     C. trachomatis DNA NEGATIVE NEGATIVE    N. gonorrhoeae DNA NEGATIVE NEGATIVE   VAGINITIS DNA PROBE    Specimen: Vaginal   Result Value Ref Range    Specimen Description . VAGINA     Special Requests NOT REPORTED     Direct Exam NEGATIVE for Candida sp. Direct Exam NEGATIVE for Gardnerella vaginalis     Direct Exam NEGATIVE for Trichomonas vaginalis     Direct Exam       Method of testing is a DNA probe intended for detection and identification of Candida species, Gardnerella vaginalis, and Trichomonas vaginalis nucleic acid in vaginal fluid specimens from patients with symptoms of vaginitis/vaginosis. Assessment:   Diagnosis Orders   1. PCO (polycystic ovaries)  metFORMIN (GLUCOPHAGE-XR) 500 MG extended release tablet   2.  Irregular menses       Chief Complaint   Patient presents with    Follow-up         Patient Active Problem List    Diagnosis Date Noted    [facility] 9/26/14 Baby A: M Apg 3/9 Wt: 3#11, Baby B: M Apg 5/9 Wt: 3#12 09/27/2014     Priority: High    Carpal tunnel syndrome 09/05/2014     Priority: High     Will try wrist braces for symptom relief      Benign essential hypertension, antepartum 09/05/2014     Priority: High     24hr urine protein = 280 9/5  S/p celestone 9/5 & 9/6    Updating deleted diagnoses      HSV infection IGM I/II + 07/22/2014     Priority: High     Negative IGG I & II  TX Valtrex 1000 mg/dy x 10 days then 500 mg /dy throughout pregnancy    Denies ever having lesions or prodrome    Repeat IGG type specific titres at 28 wks-Slip given      TSH deficiency 06/10/2014     Priority: High     TSH 6/10/14: 0.17  TSH 9/5/14: 1.24        Anemia 06/10/2014     Priority: High     6/10/2014 Ferrous Sulfate 325 mg # 30 one po daily with 6 RF      History of chlamydia      Priority: Medium     5/12/14 Negative  4/14/2014 Will treat today RTO in 4 weeks for test of cure 5/2014 NEG/NEG  Abstain  Partner needs treated  36 weeks repeat cultures GC and Chlamydia, HIV, RPR      BV (bacterial vaginosis) 04/14/2014     Priority: Low     Treated in ED 4/14      PCO (polycystic ovaries) 10/15/2020       PLAN:  Return in about 3 months (around 9/4/2021) for Follow Up Current Problem-Virtual Visit. Metformin continues at 500 xr bid-cycles regular  PNV daily if wishes conception reviewed  Barrier recs and std counseling    Counseled on preventative health maintenance follow-up. No orders of the defined types were placed in this encounter. Cortney West is a 31 y.o. female female was evaluated by a Virtual Visit (video visit) encounter to address concerns as mentioned above. A caregiver was present when appropriate. Due to this being a TeleHealth encounter (During [place] [address] public health emergency), evaluation of the following organ systems was limited: Vitals/Constitutional/EENT/Resp/CV/GI//MS/Neuro/Skin/Heme-Lymph-Imm.   Pursuant to the emergency declaration under the [address] [address] waiver authority and the [employer] Act, this Virtual Visit was conducted with patient's (and/or legal guardian's) consent, to reduce the patient's risk of exposure to COVID-19 and provide necessary medical care. The patient (and/or legal guardian) has also been advised to contact this office for worsening conditions or problems, and seek emergency medical treatment and/or call 911 if deemed necessary. Services were provided through a video synchronous discussion virtually to substitute for in-person clinic visit. Patient and provider were located at their individual homes. Electronically signed by Lew Clifton DO on 6/4/21 at 9:14 AM EDT     An electronic signature was used to authenticate this note. The patient, Sofya Brewer is a 31 y.o. female, was seen with a total time spent of 20 minutes for the visit on this date of service by the E/M provider. The time component had both face to face and non face to face time spent in determining the total time component. Counseling and education regarding her diagnosis listed below and her options regarding those diagnoses were also included in determining her time component. Diagnosis Orders   1. PCO (polycystic ovaries)  metFORMIN (GLUCOPHAGE-XR) 500 MG extended release tablet   2. Irregular menses          The patient had her preventative health maintenance recommendations and follow-up reviewed with her at the completion of her visit.

## 2021-11-19 DIAGNOSIS — E28.2 PCO (POLYCYSTIC OVARIES): ICD-10-CM

## 2021-11-22 RX ORDER — METFORMIN HYDROCHLORIDE 500 MG/1
500 TABLET, EXTENDED RELEASE ORAL 2 TIMES DAILY
Qty: 60 TABLET | Refills: 0 | Status: SHIPPED | OUTPATIENT
Start: 2021-11-22 | End: 2022-01-10 | Stop reason: SDUPTHER

## 2022-01-10 DIAGNOSIS — E28.2 PCO (POLYCYSTIC OVARIES): ICD-10-CM

## 2022-01-10 RX ORDER — METFORMIN HYDROCHLORIDE 500 MG/1
500 TABLET, EXTENDED RELEASE ORAL 2 TIMES DAILY
Qty: 60 TABLET | Refills: 0 | Status: SHIPPED | OUTPATIENT
Start: 2022-01-10 | End: 2022-01-17 | Stop reason: SDUPTHER

## 2022-01-10 NOTE — TELEPHONE ENCOUNTER
Patient requesting refill of Metformin ( has 2 left ) to bridge until seen for Annual on 1/17/22      [facility] [place]

## 2022-01-13 DIAGNOSIS — N92.6 MISSED PERIOD: Primary | ICD-10-CM

## 2022-01-15 ENCOUNTER — HOSPITAL ENCOUNTER (OUTPATIENT)
Age: 27
Discharge: HOME | End: 2022-01-15
Payer: MEDICARE

## 2022-01-15 DIAGNOSIS — N92.6 MISSED PERIOD: ICD-10-CM

## 2022-01-15 LAB — HCG QUANTITATIVE: <1 IU/L

## 2022-01-15 PROCEDURE — 84702 CHORIONIC GONADOTROPIN TEST: CPT

## 2022-01-15 PROCEDURE — 36415 COLL VENOUS BLD VENIPUNCTURE: CPT

## 2022-01-17 ENCOUNTER — OFFICE VISIT (OUTPATIENT)
Dept: OBGYN CLINIC | Age: 27
End: 2022-01-17
Payer: MEDICARE

## 2022-01-17 ENCOUNTER — HOSPITAL ENCOUNTER (OUTPATIENT)
Age: 27
Setting detail: SPECIMEN
Discharge: HOME | End: 2022-01-17

## 2022-01-17 VITALS
DIASTOLIC BLOOD PRESSURE: 86 MMHG | HEIGHT: 61 IN | SYSTOLIC BLOOD PRESSURE: 120 MMHG | WEIGHT: 193 LBS | BODY MASS INDEX: 36.44 KG/M2

## 2022-01-17 DIAGNOSIS — Z01.419 VISIT FOR GYNECOLOGIC EXAMINATION: Primary | ICD-10-CM

## 2022-01-17 DIAGNOSIS — E28.2 PCO (POLYCYSTIC OVARIES): ICD-10-CM

## 2022-01-17 PROCEDURE — 99395 PREV VISIT EST AGE 18-39: CPT | Performed by: NURSE PRACTITIONER

## 2022-01-17 PROCEDURE — G8484 FLU IMMUNIZE NO ADMIN: HCPCS | Performed by: NURSE PRACTITIONER

## 2022-01-17 RX ORDER — VITAMIN A ACETATE, .BETA.-CAROTENE, ASCORBIC ACID, CHOLECALCIFEROL, .ALPHA.-TOCOPHEROL ACETATE, DL-, THIAMINE MONONITRATE, RIBOFLAVIN, NIACINAMIDE, PYRIDOXINE HYDROCHLORIDE, FOLIC ACID, CYANOCOBALAMIN, CALCIUM CARBONATE, FERROUS FUMARATE, ZINC OXIDE, AND CUPRIC OXIDE 2000; 2000; 120; 400; 22; 1.84; 3; 20; 10; 1; 12; 200; 27; 25; 2 [IU]/1; [IU]/1; MG/1; [IU]/1; MG/1; MG/1; MG/1; MG/1; MG/1; MG/1; UG/1; MG/1; MG/1; MG/1; MG/1
TABLET ORAL
Qty: 30 TABLET | Refills: 12 | Status: SHIPPED | OUTPATIENT
Start: 2022-01-17

## 2022-01-17 RX ORDER — METFORMIN HYDROCHLORIDE 500 MG/1
500 TABLET, EXTENDED RELEASE ORAL 2 TIMES DAILY
Qty: 60 TABLET | Refills: 12 | Status: SHIPPED | OUTPATIENT
Start: 2022-01-17 | End: 2022-02-16

## 2022-01-17 ASSESSMENT — PATIENT HEALTH QUESTIONNAIRE - PHQ9
2. FEELING DOWN, DEPRESSED OR HOPELESS: 0
SUM OF ALL RESPONSES TO PHQ QUESTIONS 1-9: 0
SUM OF ALL RESPONSES TO PHQ9 QUESTIONS 1 & 2: 0
SUM OF ALL RESPONSES TO PHQ QUESTIONS 1-9: 0
1. LITTLE INTEREST OR PLEASURE IN DOING THINGS: 0

## 2022-01-17 NOTE — PROGRESS NOTES
History and Physical  [address], [address] [address], Bezaira Petersen [address]. [phone]   Fax [phone]  [address] [place] [address]  2022              31 y.o. Chief Complaint   Patient presents with    Gynecologic Exam       Patient's last menstrual period was 2021. Primary Care Physician: Laura Urias MD    The patient was seen and examined. She has no chief complaint today and is here for her annual exam.  Her bowels are regular. There are no voiding complaints. She denies any bloating. She denies vaginal discharge and was counseled on STD's and the need for barrier contraception. HPI : Dilma [facility] Alize is a 31 y.o. female     Annual exam  Has been trying to actively conceive for past 2-3 years. Has been tracking cycles. Started tracking ovulation this month. Conceived twins in  - spontaneously - was not trying to conceive. With same partner. Agrees to fertility consultation. Started on Metformin for past 1 1/2 years. Currently on 500mg PO BID. Periods occur every month, lasting 5 days long. LMP .  + urine home pregnancy test 1 week ago. Took quant HCG level 1/15/2022: negative.   Has not had period yet this month.     ________________________________________________________________________  OB History    Para Term  AB Living   1 1 0 1 0 2   SAB IAB Ectopic Molar Multiple Live Births   0 0 0 0 1 2      # Outcome Date GA Lbr Serafin/2nd Weight Sex Delivery Anes PTL Lv   1A  14 32w1d  3 lb 11.3 oz (1.681 kg)  CS-LTranv   RENEE      Apgar1: 3  Apgar5: 9   1B   32w1d  3 lb 12 oz (1.701 kg)  CS-LTranv   RENEE      Apgar1: 5  Apgar5: 9     Past Medical History:   Diagnosis Date    Anemia complicating pregnancy in first trimester 6/10/2014    Benign essential hypertension antepartum 2014    History of chlamydia     HSV (herpes simplex virus) infection IGM I/II 2014    TSH deficiency 6/10/2014                                                                   Past Surgical History:   Procedure Laterality Date     SECTION       Family History   Problem Relation Age of Onset    Hypertension Maternal Grandmother     High Blood Pressure Maternal Grandmother     Kidney Disease Maternal Grandmother     Lung Cancer Maternal Grandfather     Asthma Brother     Other Sister         fire    Mental Illness Father     Breast Cancer Neg Hx     Cancer Neg Hx     Colon Cancer Neg Hx     Diabetes Neg Hx     Eclampsia Neg Hx     Ovarian Cancer Neg Hx      Labor Neg Hx     Spont Abortions Neg Hx     Stroke Neg Hx      Social History     Socioeconomic History    Marital status: Single     Spouse name: Not on file    Number of children: Not on file    Years of education: Not on file    Highest education level: Not on file   Occupational History    Not on file   Tobacco Use    Smoking status: Never Smoker    Smokeless tobacco: Never Used   Vaping Use    Vaping Use: Never used   Substance and Sexual Activity    Alcohol use: No     Alcohol/week: 0.0 standard drinks    Drug use: No    Sexual activity: Yes     Partners: Male     Birth control/protection: Pill   Other Topics Concern    Not on file   Social History Narrative    Not on file     Social Determinants of Health     Financial Resource Strain:     Difficulty of Paying Living Expenses: Not on file   Food Insecurity:     Worried About Running Out of Food in the Last Year: Not on file    Gregory of Food in the Last Year: Not on file   Transportation Needs:     Lack of Transportation (Medical): Not on file    Lack of Transportation (Non-Medical):  Not on file   Physical Activity:     Days of Exercise per Week: Not on file    Minutes of Exercise per Session: Not on file   Stress:     Feeling of Stress : Not on file   Social Connections:     Frequency of Communication with Friends and Family: Not on file    Frequency of Social Gatherings with Friends and Family: Not on file    Attends Denominational Services: Not on file    Active Member of Clubs or Organizations: Not on file    Attends Club or Organization Meetings: Not on file    Marital Status: Not on file   Intimate Partner Violence:     Fear of Current or Ex-Partner: Not on file    Emotionally Abused: Not on file    Physically Abused: Not on file    Sexually Abused: Not on file   Housing Stability:     Unable to Pay for Housing in the Last Year: Not on file    Number of [place] in the Last Year: Not on file    Unstable Housing in the Last Year: Not on file       MEDICATIONS:  Current Outpatient Medications   Medication Sig Dispense Refill    metFORMIN (GLUCOPHAGE-XR) 500 MG extended release tablet Take 1 tablet by mouth 2 times daily 60 tablet 12    Prenatal Vit-Fe Fumarate-FA (PNV PRENATAL PLUS MULTIVITAMIN) 27-1 MG TABS TAKE 1 TABLET BY MOUTH ONE TIME A DAY 30 tablet 12     No current facility-administered medications for this visit. ALLERGIES:  Allergies as of 01/17/2022    (No Known Allergies)       Symptoms of decreased mood absent  Symptoms of anhedonia absent    **If either question is answered in a  positive fashion then complete the PHQ9 Scoring Evaluation and make the appropriate referral**      Immunization status: stated as current, but no records available. Gynecologic History:  Menarche: 6 yo  Menopause at [address] [address] [place] yo     Patient's last menstrual period was 12/20/2021. Sexually Active: Yes    STD History: yes, chlamydia 2014     Permanent Sterilization: No   Reversible Birth Control: No        Hormone Replacement Exposure: No      Genetic Qualified Family History of Breast, Ovarian , Colon or Uterine Cancer: No     If YES see scanned worksheet.     Preventative Health Testing:    Health Maintenance:  Health Maintenance Due   Topic Date Due    Hepatitis C screen  Never done    COVID-19 Vaccine (1) Never done   [facility] Depression Screen  Never done    Potassium monitoring  07/16/2020    Pap smear  03/15/2021    Flu vaccine (1) 09/01/2021    TSH testing  10/07/2021    Creatinine monitoring  10/07/2021       Date of Last Pap Smear: 3/15/2018 neg  Abnormal Pap Smear History: denies  Colposcopy History:   Date of Last Mammogram: NA  Date of Last Colonoscopy:   Date of Last Bone Density:      ________________________________________________________________________        REVIEW OF SYSTEMS:    yes   A minimum of an eleven point review of systems was completed. Review Of Systems (11 point):  Constitutional: No fever, chills or malaise; No weight change or fatigue  Head and Eyes: No vision changes, Headache, Dizziness or trauma in last 12 months  ENT ROS: No hearing, Tinnitis, sinus or taste problems  Hematological and Lymphatic ROS:No Lymphoma, Von Willebrand's, Hemophillia or Bleeding History  Psych ROS: No Depression, Homicidal thoughts,suicidal thoughts, or anxiety  Breast ROS: No breast abnormalities or lumps  Respiratory ROS: No SOB, Pneumoniae,Cough, or Pulmonary Embolism   Cardiovascular ROS: No Chest Pain with Exertion, Palpitations, Syncope, Edema, Arrhythmia  Gastrointestinal ROS: No Indigestion, Heartburn, Nausea, vomiting, Diarrhea, Constipation,or Bowel Changes; No Bloody Stools or melena  Genito-Urinary ROS: No Dysuria, Hematuria or Nocturia.  No Urinary Incontinence or Vaginal Discharge. + PCOS  Musculoskeletal ROS: No Arthralgia, Arthritis,Gout,Osteoporosis or Rheumatism  Neurological ROS: No CVA, Migraines, Epilepsy, Seizure Hx, or Limb Weakness  Dermatological ROS: No Rash, Itching, Hives, Mole Changes or Cancer                                                                                                                                                                                                                                  PHYSICAL Exam:     Constitutional:  Vitals:    01/17/22 0856   BP: 120/86   Site: Left Upper Arm   Position: Sitting   Cuff Size: Medium Adult   Weight: 193 lb (87.5 kg)   Height: 5' 1\" (1.549 m)       Chaperone for Intimate Exam   Chaperone was offered and accepted as part of the rooming process.  Chaperone: Patrick Mortensen          General Appearance: This  is a well Developed, well Nourished, well groomed female. Her BMI was reviewed. Nutritional decision making was discussed. Skin:  There was a Normal Inspection of the skin without rashes or lesions. There were no rashes. (Papular, Maculopapular, Hives, Pustular, Macular)     There were no lesions (Ulcers, Erythema, Abn. Appearing Nevi)            Lymphatic:  No Lymph Nodes were Palpable in the neck , axilla or groin.  0 # Of Lymph Nodes; Location ; Character [Normal]  [Shotty] [Tender] [Enlarged]     Neck and EENT:  The neck was supple. There were no masses   The thyroid was not enlarged and had no masses. Perrla, EOMI B/L, TMI B/L No Abnormalities. Throat inspected-No exudates or Masses, Nares Patent No Masses        Respiratory: The lungs were auscultated and found to be clear. There were no rales, rhonchi or wheezes. There was a good respiratory effort. Cardiovascular: The heart was in a regular rate and rhythm. . No S3 or S4. There was no murmur appreciated. Location, grade, and radiation are not applicable. Extremities: The patients extremities were without calf tenderness, edema, or varicosities. There was full range of motion in all four extremities. Pulses in all four extremities were appreciated and are 2/4. Abdomen: The abdomen was soft and non-tender. There were good bowel sounds in all quadrants and there was no guarding, rebound or rigidity. On evaluation there was no evidence of hepatosplenomegaly and there was no costal vertebral davida tenderness bilaterally. No hernias were appreciated. Abdominal Scars: C/S scar    Psych:   The patient had a normal Orientation to: Time, Place, Person, and Situation  There is no Mood / Affect changes    Breast:  (Chest)  normal appearance, no masses or tenderness  Self breast exams were reviewed in detail. Literature was given. Pelvic Exam:  External genitalia: normal general appearance  Urinary system: urethral meatus normal  Vaginal: normal mucosa without prolapse or lesions  Cervix: normal appearance  Adnexa: normal bimanual exam  Uterus: normal single, nontender    Rectal Exam:  exam declined by patient          Musculosk:  Normal Gait and station was noted. Digits were evaluated without abnormal findings. Range of motion, stability and strength were evaluated and found to be appropriate for the patients age. ASSESSMENT:      31 y.o. Annual   Diagnosis Orders   1. Visit for gynecologic examination  PAP Smear   2.  PCO (polycystic ovaries)  metFORMIN (GLUCOPHAGE-XR) 500 MG extended release tablet    Prenatal Vit-Fe Fumarate-FA (PNV PRENATAL PLUS MULTIVITAMIN) 27-1 MG TABS          Chief Complaint   Patient presents with    Gynecologic Exam          Past Medical History:   Diagnosis Date    Anemia complicating pregnancy in first trimester 6/10/2014    Benign essential hypertension antepartum 9/5/2014    History of chlamydia     HSV (herpes simplex virus) infection IGM I/II 7/22/2014    TSH deficiency 6/10/2014         Patient Active Problem List    Diagnosis Date Noted    PLTCS 9/26/14 Baby A: M Apg 3/9 Wt: 3#11, Baby B: M Apg 5/9 Wt: 3#12 09/27/2014     Priority: High    Carpal tunnel syndrome 09/05/2014     Priority: High     Will try wrist braces for symptom relief      Benign essential hypertension, antepartum 09/05/2014     Priority: High     24hr urine protein = 280 9/5  S/p celestone 9/5 & 9/6    Updating deleted diagnoses      HSV infection IGM I/II + 07/22/2014     Priority: High     Negative IGG I & II  TX Valtrex 1000 mg/dy x 10 days then 500 mg /dy throughout pregnancy    Denies ever having lesions or prodrome    Repeat IGG type specific titres at 28 wks-Slip given      TSH deficiency 06/10/2014     Priority: High     TSH 6/10/14: 0.17  TSH 9/5/14: 1.24        Anemia 06/10/2014     Priority: High     6/10/2014 Ferrous Sulfate 325 mg # 30 one po daily with 6 RF      History of chlamydia      Priority: Medium     5/12/14 Negative  4/14/2014 Will treat today RTO in 4 weeks for test of cure 5/2014 NEG/NEG  Abstain  Partner needs treated  36 weeks repeat cultures GC and Chlamydia, HIV, RPR      BV (bacterial vaginosis) 04/14/2014     Priority: Low     Treated in ED 4/14      PCO (polycystic ovaries) 10/15/2020          Hereditary Breast, Ovarian, Colon and Uterine Cancer screening Done. Tobacco & Secondary smoke risks reviewed; instructed on cessation and avoidance      Counseling Completed:  Preventative Health Recommendations and Follow up. The patient was informed of the recommended preventative health recommendations. 1. Annuals every year; Cytology collections per prevailing guidelines. 2. Mammograms begin every year at 35 yo if no abnormalities are found and no family history. 3. Bone density studies every 2-3 years. Begin at 73 yo. If no fracture history or osteoporosis family history. (significant). 4. Colonoscopy begin at 40 yo. Repeat every ten years if negative and no family history. 5. Calcium of 7598-7845 mg/day in split dosing  6. Vitamin D 400-800 IU/day  7. All other preventative health recommendations will be managed by the patients Primary care physician. PLAN:  Return in about 1 year (around 1/17/2023) for annual.   Pap smear obtained. Referral to fertility specialist, sperm analysis form given. Repeat Annual every 1 year  Cervical Cytology Evaluation begins at 24years old. If Negative Cytology, Follow-up screening per current guidelines. Mammograms every 1 year. If 35 yo and last mammogram was negative. Calcium and Vitamin D dosing reviewed.   Colonoscopy screening reviewed as well as onset for bone density testing. Birth control and barrier recommendations discussed. STD counseling and prevention reviewed. Gardisil counseling completed for all patients 10-37 yo. Routine health maintenance per patients PCP. Orders Placed This Encounter   Procedures    PAP Smear     Patient History:    No LMP recorded. OBGYN Status: Having periods  Past Surgical History:  No date:  SECTION    Problem List        Edg Problems Affecting Cytology    History of chlamydia    Overview Addendum 2014 12:23 PM by JUNG Lomeli CNM     14 Negative  2014 Will treat today RTO in 4 weeks for test of cure 2014 NEG/NEG  Abstain  Partner needs treated  36 weeks repeat cultures GC and Chlamydia, HIV, RPR           Social History    Tobacco Use      Smoking status: Never Smoker      Smokeless tobacco: Never Used       Standing Status:   Future     Standing Expiration Date:   2023     Order Specific Question:   Collection Type     Answer: Thin Prep     Order Specific Question:   Prior Abnormal Pap Test     Answer:   No     Order Specific Question:   Screening or Diagnostic     Answer:   Screening     Order Specific Question:   HPV Requested? Answer:   Yes - If Abnormal Reflex HPV     Order Specific Question:   High Risk Patient     Answer:   N/A           The patient, Alondra Alejandre is a 31 y.o. female, was seen with a total time spent of 20 minutes for the visit on this date of service by the E/M provider. The time component had both face to face and non face to face time spent in determining the total time component. Counseling and education regarding her diagnosis listed below and her options regarding those diagnoses were also included in determining her time component. Diagnosis Orders   1. Visit for gynecologic examination  PAP Smear   2.  PCO (polycystic ovaries)  metFORMIN (GLUCOPHAGE-XR) 500 MG extended release tablet    Prenatal Vit-Fe Fumarate-FA (PNV PRENATAL PLUS MULTIVITAMIN) 27-1 MG TABS        The patient had her preventative health maintenance recommendations and follow-up reviewed with her at the completion of her visit.

## 2022-01-26 LAB — CYTOLOGY REPORT: NORMAL

## 2023-02-03 DIAGNOSIS — E28.2 PCO (POLYCYSTIC OVARIES): ICD-10-CM

## 2023-02-03 RX ORDER — VITAMIN A ACETATE, .BETA.-CAROTENE, ASCORBIC ACID, CHOLECALCIFEROL, .ALPHA.-TOCOPHEROL ACETATE, DL-, THIAMINE MONONITRATE, RIBOFLAVIN, NIACINAMIDE, PYRIDOXINE HYDROCHLORIDE, FOLIC ACID, CYANOCOBALAMIN, CALCIUM CARBONATE, FERROUS FUMARATE, ZINC OXIDE, AND CUPRIC OXIDE 2000; 2000; 120; 400; 22; 1.84; 3; 20; 10; 1; 12; 200; 27; 25; 2 [IU]/1; [IU]/1; MG/1; [IU]/1; MG/1; MG/1; MG/1; MG/1; MG/1; MG/1; UG/1; MG/1; MG/1; MG/1; MG/1
TABLET ORAL
Qty: 30 TABLET | Refills: 0 | Status: SHIPPED | OUTPATIENT
Start: 2023-02-03

## 2023-02-10 DIAGNOSIS — E28.2 PCO (POLYCYSTIC OVARIES): ICD-10-CM

## 2023-02-10 RX ORDER — METFORMIN HYDROCHLORIDE 500 MG/1
TABLET, EXTENDED RELEASE ORAL
Qty: 60 TABLET | Refills: 0 | OUTPATIENT
Start: 2023-02-10

## 2023-02-15 DIAGNOSIS — E28.2 PCO (POLYCYSTIC OVARIES): ICD-10-CM

## 2023-02-15 RX ORDER — METFORMIN HYDROCHLORIDE 500 MG/1
500 TABLET, EXTENDED RELEASE ORAL 2 TIMES DAILY
Qty: 60 TABLET | Refills: 0 | Status: SHIPPED | OUTPATIENT
Start: 2023-02-15 | End: 2023-03-17

## 2023-02-28 ENCOUNTER — OFFICE VISIT (OUTPATIENT)
Dept: OBGYN CLINIC | Age: 28
End: 2023-02-28
Payer: MEDICAID

## 2023-02-28 ENCOUNTER — HOSPITAL ENCOUNTER (OUTPATIENT)
Age: 28
Setting detail: SPECIMEN
Discharge: HOME | End: 2023-02-28

## 2023-02-28 VITALS
WEIGHT: 196 LBS | SYSTOLIC BLOOD PRESSURE: 128 MMHG | HEIGHT: 61 IN | BODY MASS INDEX: 37 KG/M2 | DIASTOLIC BLOOD PRESSURE: 74 MMHG

## 2023-02-28 DIAGNOSIS — Z01.419 WELL FEMALE EXAM WITH ROUTINE GYNECOLOGICAL EXAM: Primary | ICD-10-CM

## 2023-02-28 DIAGNOSIS — Z87.42 HISTORY OF PCOS: ICD-10-CM

## 2023-02-28 DIAGNOSIS — L68.0 HIRSUTISM: ICD-10-CM

## 2023-02-28 DIAGNOSIS — R63.5 WEIGHT GAIN: ICD-10-CM

## 2023-02-28 DIAGNOSIS — E28.2 PCO (POLYCYSTIC OVARIES): ICD-10-CM

## 2023-02-28 DIAGNOSIS — N97.9 INABILITY TO CONCEIVE, FEMALE: ICD-10-CM

## 2023-02-28 PROCEDURE — 99395 PREV VISIT EST AGE 18-39: CPT | Performed by: NURSE PRACTITIONER

## 2023-02-28 RX ORDER — VITAMIN A ACETATE, .BETA.-CAROTENE, ASCORBIC ACID, CHOLECALCIFEROL, .ALPHA.-TOCOPHEROL ACETATE, DL-, THIAMINE MONONITRATE, RIBOFLAVIN, NIACINAMIDE, PYRIDOXINE HYDROCHLORIDE, FOLIC ACID, CYANOCOBALAMIN, CALCIUM CARBONATE, FERROUS FUMARATE, ZINC OXIDE, AND CUPRIC OXIDE 2000; 2000; 120; 400; 22; 1.84; 3; 20; 10; 1; 12; 200; 27; 25; 2 [IU]/1; [IU]/1; MG/1; [IU]/1; MG/1; MG/1; MG/1; MG/1; MG/1; MG/1; UG/1; MG/1; MG/1; MG/1; MG/1
TABLET ORAL
Qty: 30 TABLET | Refills: 12 | Status: SHIPPED | OUTPATIENT
Start: 2023-02-28

## 2023-02-28 RX ORDER — METFORMIN HYDROCHLORIDE 500 MG/1
500 TABLET, EXTENDED RELEASE ORAL 2 TIMES DAILY
Qty: 60 TABLET | Refills: 11 | Status: SHIPPED | OUTPATIENT
Start: 2023-02-28 | End: 2023-03-30

## 2023-02-28 ASSESSMENT — PATIENT HEALTH QUESTIONNAIRE - PHQ9
1. LITTLE INTEREST OR PLEASURE IN DOING THINGS: 0
SUM OF ALL RESPONSES TO PHQ QUESTIONS 1-9: 0
2. FEELING DOWN, DEPRESSED OR HOPELESS: 0
SUM OF ALL RESPONSES TO PHQ QUESTIONS 1-9: 0
SUM OF ALL RESPONSES TO PHQ9 QUESTIONS 1 & 2: 0
SUM OF ALL RESPONSES TO PHQ QUESTIONS 1-9: 0
SUM OF ALL RESPONSES TO PHQ QUESTIONS 1-9: 0

## 2023-02-28 NOTE — PROGRESS NOTES
History and Physical  [address], [address] [facility] [address], [address]en [address]. [phone]   Fax [phone]  [address] [place] [address]  2023              28 y.o. Chief Complaint   Patient presents with    Annual Exam       Patient's last menstrual period was 2023 (exact date). Primary Care Physician: Nataliia Farfan MD    The patient was seen and examined. She has no chief complaint today and is here for her annual exam.  Her bowels are regular. There are no voiding complaints. She denies any bloating. She denies vaginal discharge and was counseled on STD's and the need for barrier contraception. HPI : Dilma [facility] Alize is a 28 y.o. female     Annual exam  Hx of PCOS- currently on Metformin  BID  Diagnosed with PCOS . Periods occur monthly lasting 5 days long. Has been trying to conceive for past 3 + years. Tracking cycles and timing intercourse for past 2-3 years. Previously referred to fertility specialist but did not follow through. Been with same partner 13 years. Conceived twins naturally  with same partner. Has not been pregnant since this time. Facial hair growth.   Weight gain.  ________________________________________________________________________  OB History    Para Term  AB Living   1 1 0 1 0 2   SAB IAB Ectopic Molar Multiple Live Births   0 0 0 0 1 2      # Outcome Date GA Lbr Serafin/2nd Weight Sex Delivery Anes PTL Lv   1A  14 32w1d  3 lb 11.3 oz (1.681 kg)  CS-LTranv   RENEE      Apgar1: 3  Apgar5: 9   1B   32w1d  3 lb 12 oz (1.701 kg)  CS-LTranv   RENEE      Apgar1: 5  Apgar5: 9     Past Medical History:   Diagnosis Date    Anemia complicating pregnancy in first trimester 6/10/2014    Benign essential hypertension antepartum 2014    History of chlamydia     HSV (herpes simplex virus) infection IGM I/II 2014    TSH deficiency 6/10/2014 Past Surgical History:   Procedure Laterality Date     SECTION       Family History   Problem Relation Age of Onset    Hypertension Maternal Grandmother     High Blood Pressure Maternal Grandmother     Kidney Disease Maternal Grandmother     Lung Cancer Maternal Grandfather     Asthma Brother     Other Sister         fire    Mental Illness Father     Breast Cancer Neg Hx     Cancer Neg Hx     Colon Cancer Neg Hx     Diabetes Neg Hx     Eclampsia Neg Hx     Ovarian Cancer Neg Hx      Labor Neg Hx     Spont Abortions Neg Hx     Stroke Neg Hx      Social History     Socioeconomic History    Marital status: Single     Spouse name: Not on file    Number of children: Not on file    Years of education: Not on file    Highest education level: Not on file   Occupational History    Not on file   Tobacco Use    Smoking status: Never    Smokeless tobacco: Never   Vaping Use    Vaping Use: Never used   Substance and Sexual Activity    Alcohol use: No     Alcohol/week: 0.0 standard drinks    Drug use: No    Sexual activity: Yes     Partners: Male     Birth control/protection: Pill   Other Topics Concern    Not on file   Social History Narrative    Not on file     Social Determinants of Health     Financial Resource Strain: Not on file   Food Insecurity: Not on file   Transportation Needs: Not on file   Physical Activity: Not on file   Stress: Not on file   Social Connections: Not on file   Intimate Partner Violence: Not on file   Housing Stability: Not on file       MEDICATIONS:  Current Outpatient Medications   Medication Sig Dispense Refill    metFORMIN (GLUCOPHAGE-XR) 500 MG extended release tablet Take 1 tablet by mouth 2 times daily 60 tablet 11    Prenatal Vit-Fe Fumarate-FA (PNV PRENATAL PLUS MULTIVITAMIN) 27-1 MG TABS Take 1 tablet by mouth daily 30 tablet 12     No current facility-administered medications for this visit.            ALLERGIES:  Allergies as of 2023 (No Known Allergies)       Symptoms of decreased mood absent  Symptoms of anhedonia absent    **If either question is answered in a  positive fashion then complete the PHQ9 Scoring Evaluation and make the appropriate referral**      Immunization status: stated as current, but no records available. Gynecologic History:  Menarche: 8 yo  Menopause at [address] [place] yo     Patient's last menstrual period was 02/22/2023 (exact date). Sexually Active: Yes    STD History: Yes chlamydia in 2014     Permanent Sterilization: No   Reversible Birth Control: No        Hormone Replacement Exposure: No      Genetic Qualified Family History of Breast, Ovarian , Colon or Uterine Cancer: No     If YES see scanned worksheet. Preventative Health Testing:    Health Maintenance:  Health Maintenance Due   Topic Date Due    COVID-19 Vaccine (1) Never done    Hepatitis C screen  Never done    Flu vaccine (1) 08/01/2022    Depression Screen  01/17/2023       Date of Last Pap Smear: 1/17/2022 negative  Abnormal Pap Smear History: denies  Colposcopy History:   Date of Last Mammogram: NA  Date of Last Colonoscopy:   Date of Last Bone Density:      ________________________________________________________________________        REVIEW OF SYSTEMS:    yes   A minimum of an eleven point review of systems was completed. Review Of Systems (11 point):  Constitutional: No fever, chills or malaise;  No weight change or fatigue  Head and Eyes: No vision changes, Headache, Dizziness or trauma in last 12 months  ENT ROS: No hearing, Tinnitis, sinus or taste problems  Hematological and Lymphatic ROS:No Lymphoma, Von Willebrand's, Hemophillia or Bleeding History  Psych ROS: No Depression, Homicidal thoughts,suicidal thoughts, or anxiety  Breast ROS: No breast abnormalities or lumps  Respiratory ROS: No SOB, Pneumoniae,Cough, or Pulmonary Embolism   Cardiovascular ROS: No Chest Pain with Exertion, Palpitations, Syncope, Edema, Arrhythmia  Gastrointestinal ROS: No Indigestion, Heartburn, Nausea, vomiting, Diarrhea, Constipation,or Bowel Changes; No Bloody Stools or melena  Genito-Urinary ROS: No Dysuria, Hematuria or Nocturia. No Urinary Incontinence or Vaginal Discharge  Musculoskeletal ROS: No Arthralgia, Arthritis,Gout,Osteoporosis or Rheumatism  Neurological ROS: No CVA, Migraines, Epilepsy, Seizure Hx, or Limb Weakness  Dermatological ROS: No Rash, Itching, Hives, Mole Changes or Cancer                                                                                                                                                                                                                                  PHYSICAL Exam:     Constitutional:  Vitals:    02/28/23 1034   BP: 128/74   Site: Right Upper Arm   Position: Sitting   Cuff Size: Medium Adult   Weight: 196 lb (88.9 kg)   Height: 5' 1\" (1.549 m)       Chaperone for Intimate Exam  Chaperone was offered and accepted as part of the rooming process. Chaperone: Funmi          General Appearance: This  is a well Developed, well Nourished, well groomed female. Her BMI was reviewed. Nutritional decision making was discussed. Skin:  There was a Normal Inspection of the skin without rashes or lesions. There were no rashes. (Papular, Maculopapular, Hives, Pustular, Macular)     There were no lesions (Ulcers, Erythema, Abn. Appearing Nevi)            Lymphatic:  No Lymph Nodes were Palpable in the neck , axilla or groin.  0 # Of Lymph Nodes; Location ; Character [Normal]  [Shotty] [Tender] [Enlarged]     Neck and EENT:  The neck was supple. There were no masses   The thyroid was not enlarged and had no masses. Perrla, EOMI B/L, TMI B/L No Abnormalities. Throat inspected-No exudates or Masses, Nares Patent No Masses        Respiratory: The lungs were auscultated and found to be clear. There were no rales, rhonchi or wheezes. There was a good respiratory effort. Cardiovascular:   The heart was in a regular rate and rhythm. . No S3 or S4. There was no murmur appreciated. Location, grade, and radiation are not applicable. Extremities: The patients extremities were without calf tenderness, edema, or varicosities. There was full range of motion in all four extremities. Pulses in all four extremities were appreciated and are 2/4. Abdomen: The abdomen was soft and non-tender. There were good bowel sounds in all quadrants and there was no guarding, rebound or rigidity. On evaluation there was no evidence of hepatosplenomegaly and there was no costal vertebral davida tenderness bilaterally. No hernias were appreciated. Abdominal Scars: C/S scar    Psych: The patient had a normal Orientation to: Time, Place, Person, and Situation  There is no Mood / Affect changes    Breast:  (Chest)  normal appearance, no masses or tenderness, No nipple retraction or dimpling, No nipple discharge or bleeding, No axillary or supraclavicular adenopathy, Normal to palpation without dominant masses  Self breast exams were reviewed in detail. Literature was given. Pelvic Exam:  External genitalia: normal general appearance  Urinary system: urethral meatus normal  Vaginal: normal mucosa without prolapse or lesions  Cervix: normal appearance  Adnexa: normal bimanual exam  Uterus: normal single, nontender    Rectal Exam:  exam declined by patient          Musculosk:  Normal Gait and station was noted. Digits were evaluated without abnormal findings. Range of motion, stability and strength were evaluated and found to be appropriate for the patients age. ASSESSMENT:      28 y.o. Annual   Diagnosis Orders   1. Well female exam with routine gynecological exam  PAP SMEAR      2. History of PCOS  US PELVIS COMPLETE    US NON OB TRANSVAGINAL      3.  Weight gain  Follicle Stimulating Hormone    Luteinizing Hormone    Glucose, Fasting    Insulin, total    Hemoglobin A1C    BUN & Creatinine    AST    ALT    US PELVIS COMPLETE    US NON OB TRANSVAGINAL    CBC with Auto Differential    TSH with Reflex      4. Hirsutism  Follicle Stimulating Hormone    Luteinizing Hormone    Glucose, Fasting    Insulin, total    Hemoglobin A1C    BUN & Creatinine    AST    ALT    US PELVIS COMPLETE    US NON OB TRANSVAGINAL    CBC with Auto Differential    TSH with Reflex      5. Inability to conceive, female  [address]    CBC with Auto Differential    TSH with Reflex      6.  PCO (polycystic ovaries)  metFORMIN (GLUCOPHAGE-XR) 500 MG extended release tablet    Prenatal Vit-Fe Fumarate-FA (PNV PRENATAL PLUS MULTIVITAMIN) 27-1 MG TABS    CBC with Auto Differential    TSH with Reflex             Chief Complaint   Patient presents with    Annual Exam          Past Medical History:   Diagnosis Date    Anemia complicating pregnancy in first trimester 6/10/2014    Benign essential hypertension antepartum 9/5/2014    History of chlamydia     HSV (herpes simplex virus) infection IGM I/II 7/22/2014    TSH deficiency 6/10/2014         Patient Active Problem List    Diagnosis Date Noted    PLTCS 9/26/14 Baby A: M Apg 3/9 Wt: 3#11, Baby B: M Apg 5/9 Wt: 3#12 09/27/2014     Priority: High    Carpal tunnel syndrome 09/05/2014     Priority: High     Will try wrist braces for symptom relief      Benign essential hypertension, antepartum 09/05/2014     Priority: High     24hr urine protein = 280 9/5  S/p celestone 9/5 & 9/6    Updating deleted diagnoses      HSV infection IGM I/II + 07/22/2014     Priority: High     Negative IGG I & II  TX Valtrex 1000 mg/dy x 10 days then 500 mg /dy throughout pregnancy    Denies ever having lesions or prodrome    Repeat IGG type specific titres at 28 wks-Slip given      TSH deficiency 06/10/2014     Priority: High     TSH 6/10/14: 0.17  TSH 9/5/14: 1.24        Anemia 06/10/2014     Priority: High     6/10/2014 Ferrous Sulfate 325 mg # 30 one po daily with 6 RF      History of chlamydia      Priority: Medium 5/12/14 Negative  4/14/2014 Will treat today RTO in 4 weeks for test of cure 5/2014 NEG/NEG  Abstain  Partner needs treated  36 weeks repeat cultures GC and Chlamydia, HIV, RPR      BV (bacterial vaginosis) 04/14/2014     Priority: Low     Treated in ED 4/14      PCO (polycystic ovaries) 10/15/2020          Hereditary Breast, Ovarian, Colon and Uterine Cancer screening Done. Tobacco & Secondary smoke risks reviewed; instructed on cessation and avoidance      Counseling Completed:  Preventative Health Recommendations and Follow up. The patient was informed of the recommended preventative health recommendations. 1. Annuals every year; Cytology collections per prevailing guidelines. 2. Mammograms begin every year at 35 yo if no abnormalities are found and no family history. 3. Bone density studies every 2-3 years. Begin at 71 yo. If no fracture history or osteoporosis family history. (significant). 4. Colonoscopy begin at 40 yo. Repeat every ten years if negative and no family history. 5. Calcium of 4584-2421 mg/day in split dosing  6. Vitamin D 400-800 IU/day  7. All other preventative health recommendations will be managed by the patients Primary care physician. PLAN:  Return in about 4 weeks (around 3/28/2023) for DR Mcmahon/ follow up PCOS/ trying to conceive. Pelvic ultrasound ordered. Lab slips given. Pap smear obtained. Partner to complete sperm analysis. Instructions given. Repeat Annual every 1 year  Cervical Cytology Evaluation begins at 24years old. If Negative Cytology, Follow-up screening per current guidelines. Mammograms every 1 year. If 35 yo and last mammogram was negative. Calcium and Vitamin D dosing reviewed. Colonoscopy screening reviewed as well as onset for bone density testing. Birth control and barrier recommendations discussed. STD counseling and prevention reviewed. Gardisil counseling completed for all patients 10-35 yo.   Routine health maintenance per patients PCP. Orders Placed This Encounter   Procedures    US PELVIS COMPLETE     Standing Status:   Future     Standing Expiration Date:   2023     Order Specific Question:   Reason for exam:     Answer:   hx of PCOS    US NON OB TRANSVAGINAL     Standing Status:   Future     Standing Expiration Date:   2023     Order Specific Question:   Reason for exam:     Answer:   hx of PCOS    Follicle Stimulating Hormone     Standing Status:   Future     Standing Expiration Date:   2024    Luteinizing Hormone     Standing Status:   Future     Standing Expiration Date:   2024    Glucose, Fasting     Standing Status:   Future     Standing Expiration Date:   2024    Insulin, total     Patient must be fasting for 12-14 hrs     Standing Status:   Future     Standing Expiration Date:   3/30/2023    Hemoglobin A1C     Standing Status:   Future     Standing Expiration Date:   2024    BUN & Creatinine     Standing Status:   Future     Standing Expiration Date:   2024    AST     Standing Status:   Future     Standing Expiration Date:   2024    ALT     Standing Status:   Future     Standing Expiration Date:   2024    PAP SMEAR     Patient History:    Patient's last menstrual period was 2023 (exact date). OBGYN Status: Having periods  Past Surgical History:  No date:  SECTION    Problem List       Edg Problems Affecting Cytology    History of chlamydia    Overview Addendum 2014 12:23 PM by JUNG Cline CNM     14 Negative  2014 Will treat today RTO in 4 weeks for test of cure 2014 NEG/NEG  Abstain  Partner needs treated  36 weeks repeat cultures GC and Chlamydia, HIV, RPR      Social History    Tobacco Use      Smoking status: Never      Smokeless tobacco: Never       Standing Status:   Future     Standing Expiration Date:   2024     Order Specific Question:   Collection Type     Answer:    Thin Prep     Order Specific Question:   Prior Abnormal Pap Test     Answer:   No     Order Specific Question:   Screening or Diagnostic     Answer:   Screening     Order Specific Question:   HPV Requested? Answer:   Yes     Order Specific Question:   High Risk Patient     Answer:   N/A    CBC with Auto Differential     Standing Status:   Future     Standing Expiration Date:   2/28/2024    TSH with Reflex     Standing Status:   Future     Standing Expiration Date:   2/28/2024           The patient, Brady Morris is a 28 y.o. female, was seen with a total time spent of 20 minutes for the visit on this date of service by the E/M provider. The time component had both face to face and non face to face time spent in determining the total time component. Counseling and education regarding her diagnosis listed below and her options regarding those diagnoses were also included in determining her time component. Diagnosis Orders   1. Well female exam with routine gynecological exam  PAP SMEAR      2. History of PCOS  US PELVIS COMPLETE    US NON OB TRANSVAGINAL      3. Weight gain  Follicle Stimulating Hormone    Luteinizing Hormone    Glucose, Fasting    Insulin, total    Hemoglobin A1C    BUN & Creatinine    AST    ALT    US PELVIS COMPLETE    US NON OB TRANSVAGINAL    CBC with Auto Differential    TSH with Reflex      4. Hirsutism  Follicle Stimulating Hormone    Luteinizing Hormone    Glucose, Fasting    Insulin, total    Hemoglobin A1C    BUN & Creatinine    AST    ALT    US PELVIS COMPLETE    US NON OB TRANSVAGINAL    CBC with Auto Differential    TSH with Reflex      5. Inability to conceive, female  [address]    CBC with Auto Differential    TSH with Reflex      6.  PCO (polycystic ovaries)  metFORMIN (GLUCOPHAGE-XR) 500 MG extended release tablet    Prenatal Vit-Fe Fumarate-FA (PNV PRENATAL PLUS MULTIVITAMIN) 27-1 MG TABS    CBC with Auto Differential    TSH with Reflex           The patient had her preventative health maintenance recommendations and follow-up reviewed with her at the completion of her visit.

## 2023-03-01 ENCOUNTER — TELEPHONE (OUTPATIENT)
Dept: OBGYN CLINIC | Age: 28
End: 2023-03-01

## 2023-03-01 LAB
HPV SAMPLE: NORMAL
HPV, GENOTYPE 16: NOT DETECTED
HPV, GENOTYPE 18: NOT DETECTED
HPV, HIGH RISK OTHER: NOT DETECTED
HPV, INTERPRETATION: NORMAL
SPECIMEN DESCRIPTION: NORMAL

## 2023-03-08 ENCOUNTER — HOSPITAL ENCOUNTER (OUTPATIENT)
Dept: WOMENS IMAGING | Age: 28
Discharge: HOME | End: 2023-03-10
Payer: MEDICAID

## 2023-03-08 DIAGNOSIS — Z87.42 HISTORY OF PCOS: ICD-10-CM

## 2023-03-08 DIAGNOSIS — L68.0 HIRSUTISM: ICD-10-CM

## 2023-03-08 DIAGNOSIS — R63.5 WEIGHT GAIN: ICD-10-CM

## 2023-03-08 DIAGNOSIS — N97.9 INABILITY TO CONCEIVE, FEMALE: ICD-10-CM

## 2023-03-08 PROCEDURE — 76856 US EXAM PELVIC COMPLETE: CPT

## 2023-03-25 ENCOUNTER — HOSPITAL ENCOUNTER (OUTPATIENT)
Age: 28
Discharge: HOME | End: 2023-03-25
Payer: MEDICAID

## 2023-03-25 DIAGNOSIS — E28.2 PCO (POLYCYSTIC OVARIES): ICD-10-CM

## 2023-03-25 DIAGNOSIS — N97.9 INABILITY TO CONCEIVE, FEMALE: ICD-10-CM

## 2023-03-25 DIAGNOSIS — L68.0 HIRSUTISM: ICD-10-CM

## 2023-03-25 DIAGNOSIS — R63.5 WEIGHT GAIN: ICD-10-CM

## 2023-03-25 LAB
ABSOLUTE EOS #: 0.1 K/UL (ref 0–0.4)
ABSOLUTE LYMPH #: 2 K/UL (ref 1–4.8)
ABSOLUTE MONO #: 0.4 K/UL (ref 0.1–1.3)
ALT SERPL-CCNC: 11 U/L (ref 5–33)
AST SERPL-CCNC: 13 U/L
BASOPHILS # BLD: 1 % (ref 0–2)
BASOPHILS ABSOLUTE: 0 K/UL (ref 0–0.2)
BUN SERPL-MCNC: 10 MG/DL (ref 6–20)
CREAT SERPL-MCNC: 0.48 MG/DL (ref 0.5–0.9)
EOSINOPHILS RELATIVE PERCENT: 2 % (ref 0–4)
FOLLICLE STIMULATING HORMONE: 6.2 MIU/ML (ref 1.7–21.5)
GFR SERPL CREATININE-BSD FRML MDRD: >60 ML/MIN/1.73M2
GLUCOSE SERPL-MCNC: 94 MG/DL (ref 70–99)
HCT VFR BLD AUTO: 34.1 % (ref 36–46)
HGB BLD-MCNC: 11.1 G/DL (ref 12–16)
INSULIN REFERENCE RANGE:: NORMAL
INSULIN: 12.6 MU/L
LH: 7.7 MIU/ML (ref 1–95.6)
LYMPHOCYTES # BLD: 34 % (ref 24–44)
MCH RBC QN AUTO: 26.9 PG (ref 26–34)
MCHC RBC AUTO-ENTMCNC: 32.5 G/DL (ref 31–37)
MCV RBC AUTO: 83 FL (ref 80–100)
MONOCYTES # BLD: 7 % (ref 1–7)
PDW BLD-RTO: 16.5 % (ref 11.5–14.9)
PLATELET # BLD AUTO: 292 K/UL (ref 150–450)
PMV BLD AUTO: 8.9 FL (ref 6–12)
RBC # BLD: 4.11 M/UL (ref 4–5.2)
SEG NEUTROPHILS: 56 % (ref 36–66)
SEGMENTED NEUTROPHILS ABSOLUTE COUNT: 3.4 K/UL (ref 1.3–9.1)
TSH SERPL-ACNC: 0.8 UIU/ML (ref 0.3–5)
WBC # BLD AUTO: 6 K/UL (ref 3.5–11)

## 2023-03-25 PROCEDURE — 84520 ASSAY OF UREA NITROGEN: CPT

## 2023-03-25 PROCEDURE — 83002 ASSAY OF GONADOTROPIN (LH): CPT

## 2023-03-25 PROCEDURE — 82565 ASSAY OF CREATININE: CPT

## 2023-03-25 PROCEDURE — 84443 ASSAY THYROID STIM HORMONE: CPT

## 2023-03-25 PROCEDURE — 83036 HEMOGLOBIN GLYCOSYLATED A1C: CPT

## 2023-03-25 PROCEDURE — 36415 COLL VENOUS BLD VENIPUNCTURE: CPT

## 2023-03-25 PROCEDURE — 84460 ALANINE AMINO (ALT) (SGPT): CPT

## 2023-03-25 PROCEDURE — 85025 COMPLETE CBC W/AUTO DIFF WBC: CPT

## 2023-03-25 PROCEDURE — 84450 TRANSFERASE (AST) (SGOT): CPT

## 2023-03-25 PROCEDURE — 82947 ASSAY GLUCOSE BLOOD QUANT: CPT

## 2023-03-25 PROCEDURE — 83001 ASSAY OF GONADOTROPIN (FSH): CPT

## 2023-03-25 PROCEDURE — 83525 ASSAY OF INSULIN: CPT

## 2023-03-26 LAB
EST. AVERAGE GLUCOSE BLD GHB EST-MCNC: 103 MG/DL
HBA1C MFR BLD: 5.2 % (ref 4–6)

## 2023-04-06 ENCOUNTER — PATIENT MESSAGE (OUTPATIENT)
Dept: OBGYN CLINIC | Age: 28
End: 2023-04-06

## 2023-04-07 ENCOUNTER — TELEPHONE (OUTPATIENT)
Dept: OBGYN CLINIC | Age: 28
End: 2023-04-07

## 2023-04-07 RX ORDER — CLOTRIMAZOLE AND BETAMETHASONE DIPROPIONATE 10; .64 MG/G; MG/G
CREAM TOPICAL
Qty: 1 EACH | Refills: 1 | Status: SHIPPED | OUTPATIENT
Start: 2023-04-07

## 2023-04-07 RX ORDER — FLUCONAZOLE 150 MG/1
150 TABLET ORAL ONCE
Qty: 2 TABLET | Refills: 0 | Status: SHIPPED | OUTPATIENT
Start: 2023-04-07 | End: 2023-04-07

## 2023-04-07 NOTE — TELEPHONE ENCOUNTER
From: Natanael Price  To: Tanner Torres  Sent: 4/6/2023 6:35 PM EDT  Subject: Vaginal infection    Hi I think I may have a infection. I have stuff on my vagina that is like chunky and it looks like toilet paper got stuck to it but its not toilet paper and my vagina itches. I was wondering if you could send anything in for me or if I can come in and get checked I dont get off work till 11 am tomorrow and the itching is bad. Thank you.

## 2023-04-14 PROBLEM — N94.6 DYSMENORRHEA: Status: ACTIVE | Noted: 2023-04-14

## 2023-04-14 PROBLEM — D21.9 FIBROID: Status: ACTIVE | Noted: 2023-04-14

## 2023-10-13 ENCOUNTER — TELEMEDICINE (OUTPATIENT)
Dept: OBGYN CLINIC | Age: 28
End: 2023-10-13
Payer: MEDICAID

## 2023-10-13 DIAGNOSIS — E28.2 PCO (POLYCYSTIC OVARIES): Primary | ICD-10-CM

## 2023-10-13 DIAGNOSIS — N92.6 IRREGULAR MENSES: ICD-10-CM

## 2023-10-13 DIAGNOSIS — D50.8 IRON DEFICIENCY ANEMIA SECONDARY TO INADEQUATE DIETARY IRON INTAKE: ICD-10-CM

## 2023-10-13 PROCEDURE — 99213 OFFICE O/P EST LOW 20 MIN: CPT | Performed by: OBSTETRICS & GYNECOLOGY

## 2023-10-13 PROCEDURE — G8428 CUR MEDS NOT DOCUMENT: HCPCS | Performed by: OBSTETRICS & GYNECOLOGY

## 2023-10-13 RX ORDER — METFORMIN HYDROCHLORIDE 500 MG/1
500 TABLET, EXTENDED RELEASE ORAL 3 TIMES DAILY
Qty: 90 TABLET | Refills: 4 | Status: SHIPPED | OUTPATIENT
Start: 2023-10-13

## 2023-10-13 NOTE — PROGRESS NOTES
Chandu Price   10/13/2023        Tres Cano is a 33 y.o. female is a Established patient, presenting virtually for evaluation of the followin. PCO (polycystic ovaries)    2. Irregular menses    3. Iron deficiency anemia secondary to inadequate dietary iron intake            TELEHEALTH EVALUATION -- Audio/Visual       She was here to follow-up regarding her labs and diagnostics ordered at her last visit for the diagnosis of:    ICD-10-CM    1. PCO (polycystic ovaries)  E28.2 metFORMIN (GLUCOPHAGE-XR) 500 MG extended release tablet      2. Irregular menses  N92.6       3. Iron deficiency anemia secondary to inadequate dietary iron intake  D50.8 Hemoglobin and Hematocrit          Her bowels are regular and she is voiding without difficulty. The pt is trying to conceive without success. On 500 mg Metformin XR without cycle regularity but improved. The pt is following the diet restrictions for success and wishes to have rx increased. I will increase to 500 mg XR tid of Metformin. All questions answered. Twin hx with . I offered Clomid but I reviewed the increased risk of multiples 10-20% and pt declined at this time. AMA timing review done. Pt is on PNV and denies any family hx of NTD/AWD  on her side or sig other's side of the family. Pt is in agreement to the Metformin dosing increase to regulate menses and ovulation. Male factor not issue at this time.       Past Medical History:   Diagnosis Date    Anemia complicating pregnancy in first trimester 6/10/2014    Benign essential hypertension antepartum 2014    History of chlamydia     HSV (herpes simplex virus) infection IGM I/II 2014    TSH deficiency 6/10/2014         Past Surgical History:   Procedure Laterality Date     SECTION           Family History   Problem Relation Age of Onset    Hypertension Maternal Grandmother     High Blood Pressure Maternal Grandmother     Kidney Disease Maternal Grandmother     Lung

## 2024-02-09 ENCOUNTER — TELEMEDICINE (OUTPATIENT)
Dept: OBGYN CLINIC | Age: 29
End: 2024-02-09
Payer: COMMERCIAL

## 2024-02-09 DIAGNOSIS — E28.2 PCO (POLYCYSTIC OVARIES): Primary | ICD-10-CM

## 2024-02-09 DIAGNOSIS — D50.8 IRON DEFICIENCY ANEMIA SECONDARY TO INADEQUATE DIETARY IRON INTAKE: ICD-10-CM

## 2024-02-09 PROCEDURE — 99213 OFFICE O/P EST LOW 20 MIN: CPT | Performed by: OBSTETRICS & GYNECOLOGY

## 2024-02-09 PROCEDURE — G8428 CUR MEDS NOT DOCUMENT: HCPCS | Performed by: OBSTETRICS & GYNECOLOGY

## 2024-02-09 RX ORDER — METFORMIN HYDROCHLORIDE 750 MG/1
750 TABLET, EXTENDED RELEASE ORAL 2 TIMES DAILY
Qty: 60 TABLET | Refills: 6 | Status: SHIPPED | OUTPATIENT
Start: 2024-02-09

## 2024-02-09 NOTE — PROGRESS NOTES
Zoey Price   2024        Zoey Price is a 28 y.o. female is a Established patient, presenting virtually for evaluation of the followin. PCO (polycystic ovaries)    2. Iron deficiency anemia secondary to inadequate dietary iron intake            TELEHEALTH EVALUATION -- Audio/Visual       She was here to follow-up regarding her labs and diagnostics ordered at her last visit for the diagnosis of:    ICD-10-CM    1. PCO (polycystic ovaries)  E28.2 metFORMIN (GLUCOPHAGE-XR) 750 MG extended release tablet      2. Iron deficiency anemia secondary to inadequate dietary iron intake  D50.8         Her bowels are regular and she is voiding without difficulty. Cycles are now regular 30-45 days. Metfromin  mg tid will change to 750 mg bid for compliance.      Past Medical History:   Diagnosis Date    Anemia complicating pregnancy in first trimester 6/10/2014    Benign essential hypertension antepartum 2014    History of chlamydia     HSV (herpes simplex virus) infection IGM I/II 2014    TSH deficiency 6/10/2014         Past Surgical History:   Procedure Laterality Date     SECTION           Family History   Problem Relation Age of Onset    Hypertension Maternal Grandmother     High Blood Pressure Maternal Grandmother     Kidney Disease Maternal Grandmother     Lung Cancer Maternal Grandfather     Asthma Brother     Other Sister         fire    Mental Illness Father     Breast Cancer Neg Hx     Cancer Neg Hx     Colon Cancer Neg Hx     Diabetes Neg Hx     Eclampsia Neg Hx     Ovarian Cancer Neg Hx      Labor Neg Hx     Spont Abortions Neg Hx     Stroke Neg Hx          Social History     Tobacco Use    Smoking status: Never    Smokeless tobacco: Never   Vaping Use    Vaping Use: Never used   Substance Use Topics    Alcohol use: No     Alcohol/week: 0.0 standard drinks of alcohol    Drug use: No         MEDICATIONS:  Current Outpatient Medications   Medication Sig

## 2024-04-15 ENCOUNTER — HOSPITAL ENCOUNTER (OUTPATIENT)
Age: 29
Discharge: HOME | End: 2024-04-15
Payer: COMMERCIAL

## 2024-04-15 ENCOUNTER — TELEPHONE (OUTPATIENT)
Dept: OBGYN CLINIC | Age: 29
End: 2024-04-15

## 2024-04-15 DIAGNOSIS — D64.9 ANEMIA, UNSPECIFIED TYPE: Primary | ICD-10-CM

## 2024-04-15 DIAGNOSIS — D50.8 IRON DEFICIENCY ANEMIA SECONDARY TO INADEQUATE DIETARY IRON INTAKE: ICD-10-CM

## 2024-04-15 LAB
HCT VFR BLD AUTO: 34 % (ref 36–46)
HGB BLD-MCNC: 10.9 G/DL (ref 12–16)

## 2024-04-15 PROCEDURE — 36415 COLL VENOUS BLD VENIPUNCTURE: CPT

## 2024-04-15 PROCEDURE — 85018 HEMOGLOBIN: CPT

## 2024-04-15 PROCEDURE — 85014 HEMATOCRIT: CPT

## 2024-04-15 RX ORDER — FERROUS SULFATE 325(65) MG
325 TABLET ORAL 2 TIMES DAILY
Qty: 60 TABLET | Refills: 1 | Status: SHIPPED | OUTPATIENT
Start: 2024-04-15 | End: 2024-06-14

## 2024-04-15 RX ORDER — DOCUSATE SODIUM 100 MG/1
100 CAPSULE, LIQUID FILLED ORAL NIGHTLY PRN
Qty: 30 CAPSULE | Refills: 1 | Status: SHIPPED | OUTPATIENT
Start: 2024-04-15

## 2024-04-15 NOTE — TELEPHONE ENCOUNTER
Per Dr Mcmahon pt notified of H&H results.  Pt instructed to  FU PCP for management of anemia.  Pt to Begin FESO4 325 mg po bid and Colace for constipation 100 mg po Qhs. Scripts sent to pt pharm.  Pt scheduled for GYN FU appt in 2 weeks and HH in 2 weeks.  Order in epic.

## 2024-04-15 NOTE — TELEPHONE ENCOUNTER
----- Message from Pérez Mcmahon DO sent at 4/15/2024 12:18 PM EDT -----  FU PCP anemia  Begin FESO4 325 mg po bid  Colace for constipation 100 mg po Qhs    GYN FU appt in 2 weeks  HH in 2 weeks

## 2024-05-17 ENCOUNTER — OFFICE VISIT (OUTPATIENT)
Dept: OBGYN CLINIC | Age: 29
End: 2024-05-17
Payer: COMMERCIAL

## 2024-05-17 VITALS
DIASTOLIC BLOOD PRESSURE: 78 MMHG | RESPIRATION RATE: 14 BRPM | WEIGHT: 202 LBS | HEIGHT: 61 IN | HEART RATE: 95 BPM | SYSTOLIC BLOOD PRESSURE: 110 MMHG | BODY MASS INDEX: 38.14 KG/M2

## 2024-05-17 DIAGNOSIS — Z01.419 WELL WOMAN EXAM WITH ROUTINE GYNECOLOGICAL EXAM: Primary | ICD-10-CM

## 2024-05-17 DIAGNOSIS — D21.9 FIBROID: ICD-10-CM

## 2024-05-17 DIAGNOSIS — D50.8 IRON DEFICIENCY ANEMIA SECONDARY TO INADEQUATE DIETARY IRON INTAKE: ICD-10-CM

## 2024-05-17 DIAGNOSIS — E28.2 PCO (POLYCYSTIC OVARIES): ICD-10-CM

## 2024-05-17 PROCEDURE — 99395 PREV VISIT EST AGE 18-39: CPT | Performed by: OBSTETRICS & GYNECOLOGY

## 2024-05-17 SDOH — ECONOMIC STABILITY: INCOME INSECURITY: HOW HARD IS IT FOR YOU TO PAY FOR THE VERY BASICS LIKE FOOD, HOUSING, MEDICAL CARE, AND HEATING?: NOT HARD AT ALL

## 2024-05-17 SDOH — ECONOMIC STABILITY: HOUSING INSECURITY
IN THE LAST 12 MONTHS, WAS THERE A TIME WHEN YOU DID NOT HAVE A STEADY PLACE TO SLEEP OR SLEPT IN A SHELTER (INCLUDING NOW)?: NO

## 2024-05-17 SDOH — ECONOMIC STABILITY: FOOD INSECURITY: WITHIN THE PAST 12 MONTHS, THE FOOD YOU BOUGHT JUST DIDN'T LAST AND YOU DIDN'T HAVE MONEY TO GET MORE.: NEVER TRUE

## 2024-05-17 SDOH — ECONOMIC STABILITY: FOOD INSECURITY: WITHIN THE PAST 12 MONTHS, YOU WORRIED THAT YOUR FOOD WOULD RUN OUT BEFORE YOU GOT MONEY TO BUY MORE.: NEVER TRUE

## 2024-05-17 ASSESSMENT — PATIENT HEALTH QUESTIONNAIRE - PHQ9
SUM OF ALL RESPONSES TO PHQ9 QUESTIONS 1 & 2: 0
1. LITTLE INTEREST OR PLEASURE IN DOING THINGS: NOT AT ALL
SUM OF ALL RESPONSES TO PHQ QUESTIONS 1-9: 0
2. FEELING DOWN, DEPRESSED OR HOPELESS: NOT AT ALL

## 2024-05-17 NOTE — PROGRESS NOTES
Cytology Evaluation begins at 21 years old.  If Negative Cytology, Follow-up screening per current guidelines.   Mammograms every 1 year. If 41 yo and last mammogram was negative.  Calcium and Vitamin D dosing reviewed.  Colonoscopy screening reviewed as well as onset for bone density testing.  Birth control and barrier recommendations discussed.  STD counseling and prevention reviewed.  Gardisil counseling completed for all patients 9-44 yo. (Offered reviewed RB DECLINED)  Routine health maintenance per patients PCP.  No orders of the defined types were placed in this encounter.          The patient, Zoey Price is a 28 y.o. female, was seen with a total time spent of 30 minutes for the visit on this date of service by the E/M provider. The time component had both face to face and non face to face time spent in determining the total time component.  Counseling and education regarding her diagnosis listed below and her options regarding those diagnoses were also included in determining her time component.      Diagnosis Orders   1. Well woman exam with routine gynecological exam        2. PCO (polycystic ovaries)        3. Iron deficiency anemia secondary to inadequate dietary iron intake        4. Fibroid             The patient had her preventative health maintenance recommendations and follow-up reviewed with her at the completion of her visit.

## 2024-06-21 DIAGNOSIS — E28.2 PCO (POLYCYSTIC OVARIES): ICD-10-CM

## 2024-06-24 RX ORDER — VITAMIN A ACETATE, .BETA.-CAROTENE, ASCORBIC ACID, CHOLECALCIFEROL, .ALPHA.-TOCOPHEROL ACETATE, DL-, THIAMINE MONONITRATE, RIBOFLAVIN, NIACINAMIDE, PYRIDOXINE HYDROCHLORIDE, FOLIC ACID, CYANOCOBALAMIN, CALCIUM CARBONATE, FERROUS FUMARATE, ZINC OXIDE, AND CUPRIC OXIDE 2000; 2000; 120; 400; 22; 1.84; 3; 20; 10; 1; 12; 200; 27; 25; 2 [IU]/1; [IU]/1; MG/1; [IU]/1; MG/1; MG/1; MG/1; MG/1; MG/1; MG/1; UG/1; MG/1; MG/1; MG/1; MG/1
TABLET ORAL
Qty: 30 TABLET | Refills: 12 | Status: SHIPPED | OUTPATIENT
Start: 2024-06-24

## 2024-06-24 RX ORDER — FERROUS SULFATE 325(65) MG
325 TABLET ORAL 2 TIMES DAILY
Qty: 60 TABLET | Refills: 1 | Status: SHIPPED | OUTPATIENT
Start: 2024-06-24 | End: 2024-08-23

## 2024-08-01 ENCOUNTER — HOSPITAL ENCOUNTER (OUTPATIENT)
Age: 29
Setting detail: SPECIMEN
Discharge: HOME | End: 2024-08-01

## 2024-08-01 DIAGNOSIS — E66.01 CLASS 2 SEVERE OBESITY WITH SERIOUS COMORBIDITY AND BODY MASS INDEX (BMI) OF 38.0 TO 38.9 IN ADULT, UNSPECIFIED OBESITY TYPE (HCC): ICD-10-CM

## 2024-08-01 DIAGNOSIS — E61.1 IRON DEFICIENCY: ICD-10-CM

## 2024-08-01 LAB
ALBUMIN SERPL-MCNC: 4.5 G/DL (ref 3.5–5.2)
ALBUMIN/GLOB SERPL: 2 {RATIO} (ref 1–2.5)
ALP SERPL-CCNC: 94 U/L (ref 35–104)
ALT SERPL-CCNC: 11 U/L (ref 10–35)
ANION GAP SERPL CALCULATED.3IONS-SCNC: 11 MMOL/L (ref 9–16)
AST SERPL-CCNC: 14 U/L (ref 10–35)
BASOPHILS # BLD: 0.06 K/UL (ref 0–0.2)
BASOPHILS NFR BLD: 1 % (ref 0–2)
BILIRUB SERPL-MCNC: 0.4 MG/DL (ref 0–1.2)
BUN SERPL-MCNC: 7 MG/DL (ref 6–20)
CALCIUM SERPL-MCNC: 9.8 MG/DL (ref 8.6–10.4)
CHLORIDE SERPL-SCNC: 103 MMOL/L (ref 98–107)
CHOLEST SERPL-MCNC: 140 MG/DL (ref 0–199)
CHOLESTEROL/HDL RATIO: 4
CO2 SERPL-SCNC: 24 MMOL/L (ref 20–31)
CREAT SERPL-MCNC: 0.5 MG/DL (ref 0.5–0.9)
EOSINOPHIL # BLD: 0.17 K/UL (ref 0–0.44)
EOSINOPHILS RELATIVE PERCENT: 2 % (ref 1–4)
ERYTHROCYTE [DISTWIDTH] IN BLOOD BY AUTOMATED COUNT: 13.6 % (ref 11.8–14.4)
FERRITIN SERPL-MCNC: 14 NG/ML (ref 13–150)
GFR, ESTIMATED: >90 ML/MIN/1.73M2
GLUCOSE SERPL-MCNC: 92 MG/DL (ref 74–99)
HCT VFR BLD AUTO: 37.1 % (ref 36.3–47.1)
HDLC SERPL-MCNC: 38 MG/DL
HGB BLD-MCNC: 11.7 G/DL (ref 11.9–15.1)
IMM GRANULOCYTES # BLD AUTO: <0.03 K/UL (ref 0–0.3)
IMM GRANULOCYTES NFR BLD: 0 %
IRON SATN MFR SERPL: 18 % (ref 20–55)
IRON SERPL-MCNC: 64 UG/DL (ref 37–145)
LDLC SERPL CALC-MCNC: 80 MG/DL (ref 0–100)
LYMPHOCYTES NFR BLD: 2.13 K/UL (ref 1.1–3.7)
LYMPHOCYTES RELATIVE PERCENT: 26 % (ref 24–43)
MCH RBC QN AUTO: 27.1 PG (ref 25.2–33.5)
MCHC RBC AUTO-ENTMCNC: 31.5 G/DL (ref 28.4–34.8)
MCV RBC AUTO: 85.9 FL (ref 82.6–102.9)
MONOCYTES NFR BLD: 0.61 K/UL (ref 0.1–1.2)
MONOCYTES NFR BLD: 8 % (ref 3–12)
NEUTROPHILS NFR BLD: 63 % (ref 36–65)
NEUTS SEG NFR BLD: 5.17 K/UL (ref 1.5–8.1)
NRBC BLD-RTO: 0 PER 100 WBC
PLATELET # BLD AUTO: 332 K/UL (ref 138–453)
PMV BLD AUTO: 11.4 FL (ref 8.1–13.5)
POTASSIUM SERPL-SCNC: 4.5 MMOL/L (ref 3.7–5.3)
PROT SERPL-MCNC: 7.2 G/DL (ref 6.6–8.7)
RBC # BLD AUTO: 4.32 M/UL (ref 3.95–5.11)
SODIUM SERPL-SCNC: 138 MMOL/L (ref 136–145)
T4 FREE SERPL-MCNC: 1.2 NG/DL (ref 0.92–1.68)
TIBC SERPL-MCNC: 348 UG/DL (ref 250–450)
TRIGL SERPL-MCNC: 112 MG/DL (ref 0–149)
TSH SERPL DL<=0.05 MIU/L-ACNC: 1.02 UIU/ML (ref 0.27–4.2)
UNSATURATED IRON BINDING CAPACITY: 284 UG/DL (ref 112–347)
VLDLC SERPL CALC-MCNC: 22 MG/DL
WBC OTHER # BLD: 8.2 K/UL (ref 3.5–11.3)

## 2024-10-12 DIAGNOSIS — E28.2 PCO (POLYCYSTIC OVARIES): ICD-10-CM

## 2024-10-14 RX ORDER — VITAMIN A ACETATE, .BETA.-CAROTENE, ASCORBIC ACID, CHOLECALCIFEROL, .ALPHA.-TOCOPHEROL ACETATE, DL-, THIAMINE MONONITRATE, RIBOFLAVIN, NIACINAMIDE, PYRIDOXINE HYDROCHLORIDE, FOLIC ACID, CYANOCOBALAMIN, CALCIUM CARBONATE, FERROUS FUMARATE, ZINC OXIDE, AND CUPRIC OXIDE 2000; 2000; 120; 400; 22; 1.84; 3; 20; 10; 1; 12; 200; 27; 25; 2 [IU]/1; [IU]/1; MG/1; [IU]/1; MG/1; MG/1; MG/1; MG/1; MG/1; MG/1; UG/1; MG/1; MG/1; MG/1; MG/1
TABLET ORAL
Qty: 30 TABLET | Refills: 12 | Status: SHIPPED | OUTPATIENT
Start: 2024-10-14

## 2024-10-14 RX ORDER — METFORMIN HYDROCHLORIDE 750 MG/1
750 TABLET, EXTENDED RELEASE ORAL 2 TIMES DAILY
Qty: 60 TABLET | Refills: 6 | Status: SHIPPED | OUTPATIENT
Start: 2024-10-14

## 2025-05-19 ENCOUNTER — OFFICE VISIT (OUTPATIENT)
Dept: OBGYN CLINIC | Age: 30
End: 2025-05-19
Payer: COMMERCIAL

## 2025-05-19 ENCOUNTER — HOSPITAL ENCOUNTER (OUTPATIENT)
Age: 30
Setting detail: SPECIMEN
Discharge: HOME | End: 2025-05-19

## 2025-05-19 VITALS
HEART RATE: 80 BPM | BODY MASS INDEX: 39.27 KG/M2 | DIASTOLIC BLOOD PRESSURE: 74 MMHG | HEIGHT: 61 IN | RESPIRATION RATE: 18 BRPM | WEIGHT: 208 LBS | SYSTOLIC BLOOD PRESSURE: 118 MMHG

## 2025-05-19 DIAGNOSIS — Z11.51 SPECIAL SCREENING EXAMINATION FOR HUMAN PAPILLOMAVIRUS (HPV): ICD-10-CM

## 2025-05-19 DIAGNOSIS — Z01.419 WELL WOMAN EXAM WITH ROUTINE GYNECOLOGICAL EXAM: Primary | ICD-10-CM

## 2025-05-19 PROCEDURE — 99395 PREV VISIT EST AGE 18-39: CPT | Performed by: NURSE PRACTITIONER

## 2025-05-19 NOTE — PROGRESS NOTES
Lymph Nodes were Palpable in the neck , axilla or groin.  0 # Of Lymph Nodes; Location ; Character [Normal]  [Shotty] [Tender] [Enlarged]     Neck and EENT:  The neck was supple. There were no masses   The thyroid was not enlarged and had no masses.  Perrla, EOMI B/L, TMI B/L No Abnormalities.   Throat inspected-No exudates or Masses, Nares Patent No Masses        Respiratory:  The lungs were auscultated and found to be clear. There were no rales, rhonchi or wheezes. There was a good respiratory effort.    Cardiovascular:  The heart was in a regular rate and rhythm. . No S3 or S4. There was no murmur appreciated. Location, grade, and radiation are not applicable.     Extremities:  The patients extremities were without calf tenderness, edema, or varicosities.  There was full range of motion in all four extremities. Pulses in all four extremities were appreciated and are 2/4.    Abdomen:  The abdomen was soft and non-tender. There were good bowel sounds in all quadrants and there was no guarding, rebound or rigidity.  On evaluation there was no evidence of hepatosplenomegaly and there was no costal vertebral davida tenderness bilaterally.  No hernias were appreciated.     Abdominal Scars: C/S scar    Psych:  The patient had a normal Orientation to: Time, Place, Person, and Situation  There is no Mood / Affect changes    Breast:  (Chest)  normal appearance, no masses or tenderness, No nipple retraction or dimpling, No nipple discharge or bleeding, No axillary or supraclavicular adenopathy, Normal to palpation without dominant masses  Self breast exams were reviewed in detail. Literature was given.    Pelvic Exam:  External genitalia: normal general appearance  Urinary system: urethral meatus normal Bladder is smooth NT   Vaginal: normal mucosa without prolapse or lesions  Cervix: normal appearance  Adnexa: normal bimanual exam  Uterus: normal single, nontender    Rectal Exam:  exam declined by patient

## 2025-05-21 LAB
HPV I/H RISK 4 DNA CVX QL NAA+PROBE: NOT DETECTED
HPV SAMPLE: NORMAL
HPV, INTERPRETATION: NORMAL
HPV16 DNA CVX QL NAA+PROBE: NOT DETECTED
HPV18 DNA CVX QL NAA+PROBE: NOT DETECTED
SPECIMEN DESCRIPTION: NORMAL

## 2025-05-24 LAB — CYTOLOGY REPORT: NORMAL

## 2025-05-27 ENCOUNTER — RESULTS FOLLOW-UP (OUTPATIENT)
Dept: OBGYN CLINIC | Age: 30
End: 2025-05-27

## 2025-07-25 DIAGNOSIS — E28.2 PCO (POLYCYSTIC OVARIES): ICD-10-CM

## 2025-07-25 RX ORDER — METFORMIN HYDROCHLORIDE 750 MG/1
750 TABLET, EXTENDED RELEASE ORAL 2 TIMES DAILY
Qty: 60 TABLET | Refills: 6 | Status: SHIPPED | OUTPATIENT
Start: 2025-07-25